# Patient Record
Sex: MALE | Race: BLACK OR AFRICAN AMERICAN | NOT HISPANIC OR LATINO | Employment: UNEMPLOYED | URBAN - NONMETROPOLITAN AREA
[De-identification: names, ages, dates, MRNs, and addresses within clinical notes are randomized per-mention and may not be internally consistent; named-entity substitution may affect disease eponyms.]

---

## 2024-05-28 ENCOUNTER — HOSPITAL ENCOUNTER (INPATIENT)
Facility: HOSPITAL | Age: 29
LOS: 2 days | Discharge: NURSING, REHAB OR LONG-TERM CARE | DRG: 897 | End: 2024-05-30
Attending: PSYCHIATRY & NEUROLOGY | Admitting: PSYCHIATRY & NEUROLOGY
Payer: COMMERCIAL

## 2024-05-28 ENCOUNTER — HOSPITAL ENCOUNTER (EMERGENCY)
Facility: HOSPITAL | Age: 29
Discharge: PSYCHIATRIC OR SUBSTANCE USE FACILITY | DRG: 897 | End: 2024-05-28
Attending: EMERGENCY MEDICINE | Admitting: EMERGENCY MEDICINE
Payer: COMMERCIAL

## 2024-05-28 VITALS
HEART RATE: 94 BPM | TEMPERATURE: 98.9 F | WEIGHT: 141 LBS | BODY MASS INDEX: 19.74 KG/M2 | DIASTOLIC BLOOD PRESSURE: 93 MMHG | OXYGEN SATURATION: 100 % | SYSTOLIC BLOOD PRESSURE: 142 MMHG | RESPIRATION RATE: 18 BRPM | HEIGHT: 71 IN

## 2024-05-28 DIAGNOSIS — F19.10 SUBSTANCE ABUSE: Primary | ICD-10-CM

## 2024-05-28 LAB
ALBUMIN SERPL-MCNC: 4.3 G/DL (ref 3.5–5.2)
ALBUMIN/GLOB SERPL: 1.4 G/DL
ALP SERPL-CCNC: 83 U/L (ref 39–117)
ALT SERPL W P-5'-P-CCNC: 20 U/L (ref 1–41)
AMPHET+METHAMPHET UR QL: NEGATIVE
AMPHETAMINES UR QL: NEGATIVE
ANION GAP SERPL CALCULATED.3IONS-SCNC: 6.5 MMOL/L (ref 5–15)
AST SERPL-CCNC: 16 U/L (ref 1–40)
BARBITURATES UR QL SCN: NEGATIVE
BASOPHILS # BLD AUTO: 0.05 10*3/MM3 (ref 0–0.2)
BASOPHILS NFR BLD AUTO: 0.6 % (ref 0–1.5)
BENZODIAZ UR QL SCN: NEGATIVE
BILIRUB SERPL-MCNC: 0.3 MG/DL (ref 0–1.2)
BILIRUB UR QL STRIP: NEGATIVE
BUN SERPL-MCNC: 11 MG/DL (ref 6–20)
BUN/CREAT SERPL: 12.2 (ref 7–25)
BUPRENORPHINE SERPL-MCNC: NEGATIVE NG/ML
CALCIUM SPEC-SCNC: 9.9 MG/DL (ref 8.6–10.5)
CANNABINOIDS SERPL QL: NEGATIVE
CHLORIDE SERPL-SCNC: 105 MMOL/L (ref 98–107)
CLARITY UR: ABNORMAL
CO2 SERPL-SCNC: 28.5 MMOL/L (ref 22–29)
COCAINE UR QL: POSITIVE
COLOR UR: YELLOW
CREAT SERPL-MCNC: 0.9 MG/DL (ref 0.76–1.27)
DEPRECATED RDW RBC AUTO: 43 FL (ref 37–54)
EGFRCR SERPLBLD CKD-EPI 2021: 118.6 ML/MIN/1.73
EOSINOPHIL # BLD AUTO: 0.18 10*3/MM3 (ref 0–0.4)
EOSINOPHIL NFR BLD AUTO: 2.2 % (ref 0.3–6.2)
ERYTHROCYTE [DISTWIDTH] IN BLOOD BY AUTOMATED COUNT: 13.6 % (ref 12.3–15.4)
ETHANOL BLD-MCNC: <10 MG/DL (ref 0–10)
ETHANOL UR QL: <0.01 %
FENTANYL UR-MCNC: POSITIVE NG/ML
GLOBULIN UR ELPH-MCNC: 3.1 GM/DL
GLUCOSE SERPL-MCNC: 103 MG/DL (ref 65–99)
GLUCOSE UR STRIP-MCNC: NEGATIVE MG/DL
HAV IGM SERPL QL IA: NORMAL
HBV CORE IGM SERPL QL IA: NORMAL
HBV SURFACE AG SERPL QL IA: NORMAL
HCT VFR BLD AUTO: 44.8 % (ref 37.5–51)
HCV AB SER QL: NORMAL
HGB BLD-MCNC: 14.2 G/DL (ref 13–17.7)
HGB UR QL STRIP.AUTO: NEGATIVE
IMM GRANULOCYTES # BLD AUTO: 0.01 10*3/MM3 (ref 0–0.05)
IMM GRANULOCYTES NFR BLD AUTO: 0.1 % (ref 0–0.5)
KETONES UR QL STRIP: NEGATIVE
LEUKOCYTE ESTERASE UR QL STRIP.AUTO: NEGATIVE
LYMPHOCYTES # BLD AUTO: 3.07 10*3/MM3 (ref 0.7–3.1)
LYMPHOCYTES NFR BLD AUTO: 37 % (ref 19.6–45.3)
MAGNESIUM SERPL-MCNC: 2.2 MG/DL (ref 1.6–2.6)
MCH RBC QN AUTO: 27.5 PG (ref 26.6–33)
MCHC RBC AUTO-ENTMCNC: 31.7 G/DL (ref 31.5–35.7)
MCV RBC AUTO: 86.7 FL (ref 79–97)
METHADONE UR QL SCN: NEGATIVE
MONOCYTES # BLD AUTO: 0.59 10*3/MM3 (ref 0.1–0.9)
MONOCYTES NFR BLD AUTO: 7.1 % (ref 5–12)
NEUTROPHILS NFR BLD AUTO: 4.39 10*3/MM3 (ref 1.7–7)
NEUTROPHILS NFR BLD AUTO: 53 % (ref 42.7–76)
NITRITE UR QL STRIP: NEGATIVE
NRBC BLD AUTO-RTO: 0 /100 WBC (ref 0–0.2)
OPIATES UR QL: NEGATIVE
OXYCODONE UR QL SCN: NEGATIVE
PCP UR QL SCN: NEGATIVE
PH UR STRIP.AUTO: 8 [PH] (ref 5–8)
PLATELET # BLD AUTO: 263 10*3/MM3 (ref 140–450)
PMV BLD AUTO: 8.8 FL (ref 6–12)
POTASSIUM SERPL-SCNC: 4.2 MMOL/L (ref 3.5–5.2)
PROT SERPL-MCNC: 7.4 G/DL (ref 6–8.5)
PROT UR QL STRIP: NEGATIVE
RBC # BLD AUTO: 5.17 10*6/MM3 (ref 4.14–5.8)
SODIUM SERPL-SCNC: 140 MMOL/L (ref 136–145)
SP GR UR STRIP: 1.02 (ref 1–1.03)
TRICYCLICS UR QL SCN: NEGATIVE
UROBILINOGEN UR QL STRIP: ABNORMAL
WBC NRBC COR # BLD AUTO: 8.29 10*3/MM3 (ref 3.4–10.8)

## 2024-05-28 PROCEDURE — 99285 EMERGENCY DEPT VISIT HI MDM: CPT

## 2024-05-28 PROCEDURE — 93010 ELECTROCARDIOGRAM REPORT: CPT | Performed by: SPECIALIST

## 2024-05-28 PROCEDURE — 80307 DRUG TEST PRSMV CHEM ANLYZR: CPT | Performed by: PHYSICIAN ASSISTANT

## 2024-05-28 PROCEDURE — 93005 ELECTROCARDIOGRAM TRACING: CPT | Performed by: PSYCHIATRY & NEUROLOGY

## 2024-05-28 PROCEDURE — 63710000001 ONDANSETRON ODT 4 MG TABLET DISPERSIBLE: Performed by: PHYSICIAN ASSISTANT

## 2024-05-28 PROCEDURE — 85025 COMPLETE CBC W/AUTO DIFF WBC: CPT | Performed by: PHYSICIAN ASSISTANT

## 2024-05-28 PROCEDURE — 83735 ASSAY OF MAGNESIUM: CPT | Performed by: PHYSICIAN ASSISTANT

## 2024-05-28 PROCEDURE — 82077 ASSAY SPEC XCP UR&BREATH IA: CPT | Performed by: PHYSICIAN ASSISTANT

## 2024-05-28 PROCEDURE — 36415 COLL VENOUS BLD VENIPUNCTURE: CPT

## 2024-05-28 PROCEDURE — 80053 COMPREHEN METABOLIC PANEL: CPT | Performed by: PHYSICIAN ASSISTANT

## 2024-05-28 PROCEDURE — 81003 URINALYSIS AUTO W/O SCOPE: CPT | Performed by: PHYSICIAN ASSISTANT

## 2024-05-28 PROCEDURE — HZ2ZZZZ DETOXIFICATION SERVICES FOR SUBSTANCE ABUSE TREATMENT: ICD-10-PCS | Performed by: PSYCHIATRY & NEUROLOGY

## 2024-05-28 PROCEDURE — 80074 ACUTE HEPATITIS PANEL: CPT | Performed by: PSYCHIATRY & NEUROLOGY

## 2024-05-28 RX ORDER — ALUMINA, MAGNESIA, AND SIMETHICONE 2400; 2400; 240 MG/30ML; MG/30ML; MG/30ML
15 SUSPENSION ORAL EVERY 6 HOURS PRN
Status: DISCONTINUED | OUTPATIENT
Start: 2024-05-28 | End: 2024-05-30 | Stop reason: HOSPADM

## 2024-05-28 RX ORDER — BENZTROPINE MESYLATE 1 MG/ML
1 INJECTION INTRAMUSCULAR; INTRAVENOUS ONCE AS NEEDED
Status: DISCONTINUED | OUTPATIENT
Start: 2024-05-28 | End: 2024-05-30 | Stop reason: HOSPADM

## 2024-05-28 RX ORDER — CLONIDINE HYDROCHLORIDE 0.1 MG/1
0.1 TABLET ORAL 4 TIMES DAILY PRN
Status: DISPENSED | OUTPATIENT
Start: 2024-05-28 | End: 2024-05-29

## 2024-05-28 RX ORDER — FAMOTIDINE 20 MG/1
20 TABLET, FILM COATED ORAL 2 TIMES DAILY PRN
Status: DISCONTINUED | OUTPATIENT
Start: 2024-05-28 | End: 2024-05-30 | Stop reason: HOSPADM

## 2024-05-28 RX ORDER — ECHINACEA PURPUREA EXTRACT 125 MG
2 TABLET ORAL AS NEEDED
Status: DISCONTINUED | OUTPATIENT
Start: 2024-05-28 | End: 2024-05-30 | Stop reason: HOSPADM

## 2024-05-28 RX ORDER — IBUPROFEN 400 MG/1
400 TABLET ORAL EVERY 6 HOURS PRN
Status: DISCONTINUED | OUTPATIENT
Start: 2024-05-28 | End: 2024-05-30 | Stop reason: HOSPADM

## 2024-05-28 RX ORDER — ONDANSETRON 4 MG/1
4 TABLET, ORALLY DISINTEGRATING ORAL EVERY 6 HOURS PRN
Status: DISCONTINUED | OUTPATIENT
Start: 2024-05-28 | End: 2024-05-30 | Stop reason: HOSPADM

## 2024-05-28 RX ORDER — LOPERAMIDE HYDROCHLORIDE 2 MG/1
2 CAPSULE ORAL
Status: DISCONTINUED | OUTPATIENT
Start: 2024-05-28 | End: 2024-05-30 | Stop reason: HOSPADM

## 2024-05-28 RX ORDER — TRAZODONE HYDROCHLORIDE 50 MG/1
50 TABLET ORAL NIGHTLY PRN
Status: DISCONTINUED | OUTPATIENT
Start: 2024-05-28 | End: 2024-05-30 | Stop reason: HOSPADM

## 2024-05-28 RX ORDER — CLONIDINE HYDROCHLORIDE 0.1 MG/1
0.1 TABLET ORAL 4 TIMES DAILY PRN
Status: DISPENSED | OUTPATIENT
Start: 2024-05-29 | End: 2024-05-30

## 2024-05-28 RX ORDER — BENZTROPINE MESYLATE 1 MG/1
2 TABLET ORAL ONCE AS NEEDED
Status: DISCONTINUED | OUTPATIENT
Start: 2024-05-28 | End: 2024-05-30 | Stop reason: HOSPADM

## 2024-05-28 RX ORDER — HYDROXYZINE 50 MG/1
50 TABLET, FILM COATED ORAL EVERY 6 HOURS PRN
Status: DISCONTINUED | OUTPATIENT
Start: 2024-05-28 | End: 2024-05-30 | Stop reason: HOSPADM

## 2024-05-28 RX ORDER — CLONIDINE HYDROCHLORIDE 0.1 MG/1
0.1 TABLET ORAL ONCE
Status: COMPLETED | OUTPATIENT
Start: 2024-05-28 | End: 2024-05-28

## 2024-05-28 RX ORDER — ONDANSETRON 2 MG/ML
4 INJECTION INTRAMUSCULAR; INTRAVENOUS ONCE
Status: DISCONTINUED | OUTPATIENT
Start: 2024-05-28 | End: 2024-05-28

## 2024-05-28 RX ORDER — ACETAMINOPHEN 325 MG/1
650 TABLET ORAL EVERY 6 HOURS PRN
Status: DISCONTINUED | OUTPATIENT
Start: 2024-05-28 | End: 2024-05-30 | Stop reason: HOSPADM

## 2024-05-28 RX ORDER — DICYCLOMINE HYDROCHLORIDE 10 MG/1
10 CAPSULE ORAL 3 TIMES DAILY PRN
Status: DISCONTINUED | OUTPATIENT
Start: 2024-05-28 | End: 2024-05-30 | Stop reason: HOSPADM

## 2024-05-28 RX ORDER — ONDANSETRON 4 MG/1
4 TABLET, ORALLY DISINTEGRATING ORAL ONCE
Status: COMPLETED | OUTPATIENT
Start: 2024-05-28 | End: 2024-05-28

## 2024-05-28 RX ORDER — BENZONATATE 100 MG/1
100 CAPSULE ORAL 3 TIMES DAILY PRN
Status: DISCONTINUED | OUTPATIENT
Start: 2024-05-28 | End: 2024-05-30 | Stop reason: HOSPADM

## 2024-05-28 RX ORDER — CLONIDINE HYDROCHLORIDE 0.1 MG/1
0.1 TABLET ORAL 3 TIMES DAILY PRN
Status: DISCONTINUED | OUTPATIENT
Start: 2024-05-30 | End: 2024-05-30 | Stop reason: HOSPADM

## 2024-05-28 RX ORDER — CLONIDINE HYDROCHLORIDE 0.1 MG/1
0.1 TABLET ORAL ONCE AS NEEDED
Status: DISCONTINUED | OUTPATIENT
Start: 2024-06-01 | End: 2024-05-30 | Stop reason: HOSPADM

## 2024-05-28 RX ORDER — CLONIDINE HYDROCHLORIDE 0.1 MG/1
0.1 TABLET ORAL 2 TIMES DAILY PRN
Status: DISCONTINUED | OUTPATIENT
Start: 2024-05-31 | End: 2024-05-30 | Stop reason: HOSPADM

## 2024-05-28 RX ORDER — HYDRALAZINE HYDROCHLORIDE 25 MG/1
25 TABLET, FILM COATED ORAL DAILY PRN
Status: DISCONTINUED | OUTPATIENT
Start: 2024-05-28 | End: 2024-05-30 | Stop reason: HOSPADM

## 2024-05-28 RX ORDER — CYCLOBENZAPRINE HCL 10 MG
10 TABLET ORAL 3 TIMES DAILY PRN
Status: DISCONTINUED | OUTPATIENT
Start: 2024-05-28 | End: 2024-05-30 | Stop reason: HOSPADM

## 2024-05-28 RX ADMIN — CLONIDINE HYDROCHLORIDE 0.1 MG: 0.1 TABLET ORAL at 15:05

## 2024-05-28 RX ADMIN — CLONIDINE HYDROCHLORIDE 0.1 MG: 0.1 TABLET ORAL at 12:43

## 2024-05-28 RX ADMIN — CYCLOBENZAPRINE 10 MG: 10 TABLET, FILM COATED ORAL at 15:05

## 2024-05-28 RX ADMIN — HYDROXYZINE HYDROCHLORIDE 50 MG: 50 TABLET, FILM COATED ORAL at 15:05

## 2024-05-28 RX ADMIN — IBUPROFEN 400 MG: 400 TABLET, FILM COATED ORAL at 21:50

## 2024-05-28 RX ADMIN — ONDANSETRON 4 MG: 4 TABLET, ORALLY DISINTEGRATING ORAL at 12:43

## 2024-05-28 RX ADMIN — CLONIDINE HYDROCHLORIDE 0.1 MG: 0.1 TABLET ORAL at 21:50

## 2024-05-28 RX ADMIN — IBUPROFEN 400 MG: 400 TABLET, FILM COATED ORAL at 15:05

## 2024-05-28 RX ADMIN — TRAZODONE HYDROCHLORIDE 50 MG: 50 TABLET ORAL at 21:50

## 2024-05-28 NOTE — ED PROVIDER NOTES
Subjective   History of Present Illness  28-year-old male presents secondary to need for detox from fentanyl and cocaine.  He states that his last use was 3 days ago.  He states he has had nausea vomiting abdominal pain and cramping.  No suicidal homicidal ideation      Review of Systems   Constitutional: Negative.  Negative for fever.   HENT: Negative.     Respiratory: Negative.     Cardiovascular: Negative.  Negative for chest pain.   Gastrointestinal: Negative.  Negative for abdominal pain.   Endocrine: Negative.    Genitourinary: Negative.  Negative for dysuria.   Skin: Negative.    Neurological: Negative.    Psychiatric/Behavioral:  Negative for suicidal ideas.    All other systems reviewed and are negative.      Past Medical History:   Diagnosis Date    Substance abuse        No Known Allergies    Past Surgical History:   Procedure Laterality Date    NO PAST SURGERIES         History reviewed. No pertinent family history.    Social History     Socioeconomic History    Marital status: Single     Spouse name: denies    Number of children: 1    Highest education level: High school graduate   Tobacco Use    Smoking status: Every Day     Current packs/day: 0.25     Average packs/day: 0.3 packs/day for 5.0 years (1.3 ttl pk-yrs)     Types: Cigarettes     Start date: 5/28/2019     Passive exposure: Current    Smokeless tobacco: Never   Vaping Use    Vaping status: Never Used   Substance and Sexual Activity    Alcohol use: Not Currently     Comment: denies    Drug use: Yes     Types: Fentanyl, Cocaine(coke)    Sexual activity: Defer           Objective   Physical Exam  Vitals and nursing note reviewed.   Constitutional:       General: He is not in acute distress.     Appearance: He is well-developed. He is not diaphoretic.   HENT:      Head: Normocephalic and atraumatic.      Right Ear: External ear normal.      Left Ear: External ear normal.      Nose: Nose normal.   Eyes:      Conjunctiva/sclera: Conjunctivae  normal.      Pupils: Pupils are equal, round, and reactive to light.   Neck:      Vascular: No JVD.      Trachea: No tracheal deviation.   Cardiovascular:      Rate and Rhythm: Normal rate and regular rhythm.      Heart sounds: Normal heart sounds. No murmur heard.  Pulmonary:      Effort: Pulmonary effort is normal. No respiratory distress.      Breath sounds: Normal breath sounds. No wheezing.   Abdominal:      General: Bowel sounds are normal.      Palpations: Abdomen is soft.      Tenderness: There is no abdominal tenderness.   Musculoskeletal:         General: No deformity. Normal range of motion.      Cervical back: Normal range of motion and neck supple.   Skin:     General: Skin is warm and dry.      Coloration: Skin is not pale.      Findings: No erythema or rash.   Neurological:      Mental Status: He is alert and oriented to person, place, and time.      Cranial Nerves: No cranial nerve deficit.   Psychiatric:         Behavior: Behavior normal.         Thought Content: Thought content normal. Thought content does not include homicidal or suicidal ideation.         Procedures           ED Course                                             Medical Decision Making  28-year-old male presents secondary to need for detox from fentanyl and cocaine.  He states that his last use was 3 days ago.  He states he has had nausea vomiting abdominal pain and cramping.  No suicidal homicidal ideation    Amount and/or Complexity of Data Reviewed  Labs: ordered. Decision-making details documented in ED Course.    Risk  Prescription drug management.        Final diagnoses:   Substance abuse       ED Disposition  ED Disposition       ED Disposition   DC/Transfer to Behavioral Health    Condition   Stable    Comment   --               No follow-up provider specified.       Medication List      No changes were made to your prescriptions during this visit.            Edgar Azevedo PA  05/28/24 1942

## 2024-05-28 NOTE — NURSING NOTE
Pt assessment complete    [facility] he [facility]/24   Pt states he has been using Fentanyl- 1 gram daily intranasally last use 2-3 days ago   Pt reports using cocaine- 1 gram daily intranasally last use 2-3 days ago.   Cows 13  Denies si/hi/avh   Poor appetite/sleep   Depression 7  Anxiety 10

## 2024-05-29 PROBLEM — F14.20 COCAINE USE DISORDER, SEVERE, DEPENDENCE: Status: ACTIVE | Noted: 2024-05-29

## 2024-05-29 PROBLEM — F11.20 OPIOID USE DISORDER, SEVERE, DEPENDENCE: Status: ACTIVE | Noted: 2024-05-28

## 2024-05-29 LAB
QT INTERVAL: 360 MS
QTC INTERVAL: 391 MS

## 2024-05-29 PROCEDURE — 99222 1ST HOSP IP/OBS MODERATE 55: CPT | Performed by: PSYCHIATRY & NEUROLOGY

## 2024-05-29 RX ADMIN — CYCLOBENZAPRINE 10 MG: 10 TABLET, FILM COATED ORAL at 09:07

## 2024-05-29 RX ADMIN — TRAZODONE HYDROCHLORIDE 50 MG: 50 TABLET ORAL at 21:28

## 2024-05-29 RX ADMIN — CYCLOBENZAPRINE 10 MG: 10 TABLET, FILM COATED ORAL at 21:28

## 2024-05-29 RX ADMIN — CLONIDINE HYDROCHLORIDE 0.1 MG: 0.1 TABLET ORAL at 09:07

## 2024-05-29 NOTE — PROGRESS NOTES
Navigator is helping with the following referral:    [facility] - [phone]  -Spoke with staff. They will accept patient at discharge. 5/29

## 2024-05-29 NOTE — PLAN OF CARE
Goal Outcome Evaluation:  Plan of Care Reviewed With: patient  Patient Agreement with Plan of Care: agrees     Progress: no change  Outcome Evaluation: New admission previous shift; pt oriented to unit rules and medications. Pt denies SI/HI/AVH.  Pt appeared to sleep throughout the night, approximately 6 hours this shift.

## 2024-05-29 NOTE — H&P
INITIAL PSYCHIATRIC HISTORY & PHYSICAL    Patient Identification:  Name:   Harpal Poe  Age:   28 y.o.  Sex:   male  :   1995  MRN:   [id number]  Visit Number:   [id number]  Primary Care Physician:   Provider, No Known    SUBJECTIVE    CC/Focus of Exam detox    HPI: Harpal Poe is a 28 y.o. male who was admitted on 2024 with complaints of drug use and withdrawals. The patient reports a long history of substance use. First use was 19 years old. Over time the use increased and the patient  continued to use despite negative consequences including relationship problems, social and financial problems. The patient endorses symptoms of tolerance and withdrawals and ongoing cravings to use. Has tried to cut down and stop but has not been successful. Spends too much time and resources in pursuit of substance use. Longest period of sobriety is reported to be 7 months.  Currently using fentanyl, cocaine each a gram daily via snorting.   Last use 2024  Withdrawal symptoms body aches, headaches, restlessness, chills, sweats  Patient denies any alcohol abuse.  Patient states that he uses tobacco.  Patient denies any history of seizures with withdrawal.  Patient states he got bored and relapsed.  Patient states finances as a stressor in his life.  Patient denies any history of physical, mental, or sexual abuse.  Patient rates his appetite as poor.  Patient rates his sleep as poor.  Patient states that he has nightmares.  Patient rates his anxiety on a scale of 1-10 with 10 being the most severe a 10.  Patient rates his depression on a scale of 1-10 with 10 being the most severe a 7.  Patient rates his cravings on a scale of 1-10 with 10 being the most severe a 10.  Patient's COWS was 13.  Patient denies any suicidal ideation.  Patient denies any homicidal ideation.  Patient denies any hallucinations.  Patient was admitted to [facility] psychiatry for further safety and  stabilization.    Available medical/psychiatric records reviewed and incorporated into the current document.     PAST PSYCHIATRIC HX: Patient has had no prior admissions.  Patient denies any outpatient care.    SUBSTANCE USE HX: UDS was positive for fentanyl, cocaine.  See HPI for current use.    SOCIAL HX: Patient states he was born and raised in [facility].  Patient states he currently resides with his mother in [place].  Patient states he is single and has 1 daughter that lives with her paternal grandmother.  Patient states he is currently unemployed.  Patient states he has a high school diploma.  Patient denies any legal issues.    Past Medical History:   Diagnosis Date    Substance abuse        Past Surgical History:   Procedure Laterality Date    NO PAST SURGERIES         History reviewed. No pertinent family history.      No medications prior to admission.           ALLERGIES:  Patient has no known allergies.    Temp:  [97.1 °F (36.2 °C)-97.6 °F (36.4 °C)] 97.3 °F (36.3 °C)  Heart Rate:  [] 96  Resp:  [16-18] 18  BP: (101-136)/(63-90) 116/74    REVIEW OF SYSTEMS:  Review of Systems   Constitutional:  Positive for chills, diaphoresis and fatigue.   HENT: Negative.  Positive for rhinorrhea.    Eyes: Negative.    Respiratory: Negative.     Cardiovascular: Negative.    Gastrointestinal:  Positive for nausea.   Endocrine: Negative.    Genitourinary: Negative.    Musculoskeletal: Negative.  Positive for arthralgias and myalgias.   Skin: Negative.    Allergic/Immunologic: Negative.    Neurological:  Positive for weakness.   Hematological: Negative.    Psychiatric/Behavioral:  Positive for dysphoric mood. The patient is nervous/anxious.       See HPI for psychiatric ROS  OBJECTIVE    PHYSICAL EXAM:  Physical Exam  Constitutional:  Appears well-developed and well-nourished.   HENT:   Head: Normocephalic and atraumatic.   Right Ear: External ear normal.   Left Ear: External ear normal.   Mouth/Throat:  Oropharynx is clear and moist.   Eyes: Pupils are equal, round, and reactive to light. Conjunctivae and EOM are normal.   Neck: Normal range of motion. Neck supple.   Cardiovascular: Normal rate, regular rhythm and normal heart sounds.    Respiratory: Effort normal and breath sounds normal. No respiratory distress. No wheezes.   GI: Soft. Bowel sounds are normal.No distension. There is no tenderness.   Musculoskeletal: Normal range of motion. No edema or deformity.   Neurological:No cranial nerve deficit. Coordination normal.   Skin: Skin is warm and dry. No rash noted. No erythema.     MENTAL STATUS EXAM:               Hygiene:   fair  Cooperation:  Cooperative  Eye Contact:  Good  Psychomotor Behavior:  Appropriate  Affect:  Appropriate  Hopelessness: 5  Speech:  Normal  Linear  Thought Content:  Normal  Suicidal:  None  Homicidal:  None  Hallucinations:  None  Delusion:  None  Memory:  Intact  Orientation:  Person, Place, Time, and Situation  Reliability:  fair  Insight:  Fair  Judgement:  Poor  Impulse Control:  Poor      Imaging Results (Last 24 Hours)       ** No results found for the last 24 hours. **             ECG/EMG Results (most recent)       Procedure Component Value Units Date/Time    ECG 12 Lead Other; Baseline Cardiac Status [[id number]] Collected: 05/28/24 1642     Updated: 05/29/24 0947     QT Interval 360 ms      QTC Interval 391 ms     Narrative:      Test Reason : Other~  Blood Pressure :   */*   mmHG  Vent. Rate :  71 BPM     Atrial Rate :  71 BPM     P-R Int : 168 ms          QRS Dur :  88 ms      QT Int : 360 ms       P-R-T Axes :  77  83  61 degrees     QTc Int : 391 ms    Normal sinus rhythm  Nonspecific ST abnormality  Abnormal ECG  When compared with ECG of 28-MAY-2024 16:42, (Unconfirmed)  No significant change was found  Confirmed by Angie Rushing ([id number]) on 5/29/2024 9:47:17 AM    Referred By: FRANSICO           Confirmed By: Angie Rushing             Lab Results   Component Value Date     GLUCOSE 103 (H) 05/28/2024    BUN 11 05/28/2024    CREATININE 0.90 05/28/2024    BCR 12.2 05/28/2024    CO2 28.5 05/28/2024    CALCIUM 9.9 05/28/2024    ALBUMIN 4.3 05/28/2024    AST 16 05/28/2024    ALT 20 05/28/2024       Lab Results   Component Value Date    WBC 8.29 05/28/2024    HGB 14.2 05/28/2024    HCT 44.8 05/28/2024    MCV 86.7 05/28/2024     05/28/2024       Pain Management Panel          Latest Ref Rng & Units 5/28/2024   Pain Management Panel   Amphetamine, Urine Qual Negative Negative    Barbiturates Screen, Urine Negative Negative    Benzodiazepine Screen, Urine Negative Negative    Buprenorphine, Screen, Urine Negative Negative    Cocaine Screen, Urine Negative Positive    Fentanyl, Urine Negative Positive    Methadone Screen , Urine Negative Negative    Methamphetamine, Ur Negative Negative        Brief Urine Lab Results  (Last result in the past 365 days)        Color   Clarity   Blood   Leuk Est   Nitrite   Protein   CREAT   Urine HCG        05/28/24 1121 Yellow   Cloudy   Negative   Negative   Negative   Negative                   Reviewed labs and studies done with this admission.       ASSESSMENT & PLAN:        Opioid use disorder, severe, dependence  -Clonidine detox  -Comfort meds      Cocaine use disorder, severe, dependence  -Supportive treatment      Nicotine use disorder  -Encouraged cessation    Hospital bed: No    The patient has been admitted for safety and stabilization.  Patient will be monitored for suicidality daily and maintained on Special Precautions Level 4 (q30 min checks).  The patient will have individual and group therapy with a master's level therapist. A master treatment plan will be developed and agreed upon by the patient and his/her treatment team.  The patient's estimated length of stay in the hospital is 5-7 days.       Written by Elba Amezcua acting as scribe for Dr.Mazhar Resendiz signature on this note affirms that the note adequately documents the care  provided.   This note was generated using a scribe,   Elba Amezcua MA  05/29/24  12:49 PM EDT    I, Keily Resendiz MD, personally performed the services described in this documentation as scribed by the above named individual in my presence, and it is both accurate and complete.

## 2024-05-29 NOTE — PLAN OF CARE
Goal Outcome Evaluation:        Problem: Adult Behavioral Health Plan of Care  Goal: Patient-Specific Goal (Individualization)  Outcome: Ongoing, Progressing  Flowsheets  Taken 5/29/2024 0929 by Estephania Webb CSW  Patient-Specific Goals (Include Timeframe): Identify 2-3 coping skills, address relapse prevention methods, complete aftercare plans, and deny SI/HI prior to discharge.  Individualized Care Needs: Therapist to offer 1-4 therapy sessions, aftercare planning, safety planning, family education, group therapy, and brief CBT/MI interventions.  Taken 5/29/2024 0923 by Estephania Webb CSW  Patient Personal Strengths:   resilient   resourceful   motivated for recovery   motivated for treatment   family/social support   stable living environment  Patient Vulnerabilities:   occupational insecurity   history of unsuccessful treatment   poor impulse control   lacks insight into illness   substance abuse/addiction  Taken 5/28/2024 1504 by Tia Hall RN  Anxieties, Fears or Concerns: None verbalized  Goal: Optimized Coping Skills in Response to Life Stressors  Outcome: Ongoing, Progressing  Flowsheets (Taken 5/29/2024 0929)  Optimized Coping Skills in Response to Life Stressors: making progress toward outcome  Intervention: Promote Effective Coping Strategies  Flowsheets (Taken 5/29/2024 0929)  Supportive Measures:   active listening utilized   counseling provided   decision-making supported   goal-setting facilitated   verbalization of feelings encouraged   self-responsibility promoted   self-reflection promoted   positive reinforcement provided   self-care encouraged  Goal: Develops/Participates in Therapeutic Lester to Support Successful Transition  Outcome: Ongoing, Progressing  Flowsheets (Taken 5/29/2024 0929)  Develops/Participates in Therapeutic Lester to Support Successful Transition: making progress toward outcome  Intervention: Foster Therapeutic Lester  Flowsheets (Taken 5/29/2024  0982)  Trust Relationship/Rapport:   care explained   questions encouraged   choices provided   reassurance provided   emotional support provided   thoughts/feelings acknowledged   empathic listening provided   questions answered  Intervention: Mutually Develop Transition Plan  Flowsheets  Taken 5/29/2024 0929  Outpatient/Agency/Support Group Needs: residential services  Transition Support:   follow-up care discussed   follow-up care coordinated   community resources reviewed   crisis management plan promoted   crisis management plan verbalized  Anticipated Discharge Disposition: residential substance use unit  Taken 5/29/2024 0927  Discharge Coordination/Progress: Patient has [facility] [facility]. Therapist met with patient to complete assessment. Patient presents from [facility].  Transportation Anticipated: agency  Transportation Concerns: none  Current Discharge Risk: substance use/abuse  Concerns to be Addressed:   substance/tobacco abuse/use   coping/stress   cognitive/perceptual   mental health   discharge planning  Readmission Within the Last 30 Days: no previous admission in last 30 days  Patient/Family Anticipated Services at Transition: rehabilitation services  Patient's Choice of Community Agency(s): [facility]  Patient/Family Anticipates Transition to: inpatient rehabilitation facility  Offered/Gave Vendor List: no      DATA:      Therapist met individually with patient this date to introduce role and to discuss hospitalization expectations. Patient agreeable.     Patient has signed consent for [facility].     Clinical Maneuvering/Intervention:     Therapist assisted patient in processing session content; acknowledged and normalized patient’s thoughts, feelings, and concerns. Discussed the therapist/patient relationship and explain the parameters and limitations of relative confidentiality. Also discussed the importance of active participation, and honesty to the  treatment process. Encouraged the patient to discuss/vent their feelings, frustrations, and fears concerning their ongoing medical issues and validated their feelings.     Discussed the importance of finding enjoyable activities and coping skills that the patient can engage in a regular basis. Discussed healthy coping skills such as distraction, self love, grounding, thought challenges/reframing, etc. Provided patient with list of healthy coping skills this date. Discussed the importance of medication compliance. Praised the patient for seeking help and spent the majority of the session building rapport.       Allowed patient to freely discuss issues without interruption or judgment. Provided safe, confidential environment to facilitate the development of positive therapeutic relationship and encourage open, honest communication.      Therapist addressed discharge safety planning this date. Assisted patient in identifying risk factors which would indicate the need for higher level of care after discharge;  including thoughts to harm self or others and/or self-harming behavior. Encouraged patient to call 911, or present to the nearest emergency room should any of these events occur. Discussed crisis intervention services and means to access. Encouraged securing any objects of harm.       Therapist completed integrated summary, treatment plan, and initiated social history this date. Therapist is strongly encouraging family involvement in treatment.       ASSESSMENT:      The patient is a 27 y/o male admitted for detox treatment. Therapist met with patient on this date to complete assessment. Patient reports high anxiety and depression, denies SI/HI/AVH. He reports use of fentanyl and cocaine. Reports experiencing sweats, restlessness, chills, and body aches. Patient has had no past [facility] admissions. Longest period of sobriety has been 7 months. Patient normally lives at home with his mother. Presents from  [facility] at this time and plans to return. Agency will provide transportation at discharge.    PLAN:       Patient to remain hospitalized this date.     Treatment team will focus efforts on stabilizing patient's acute symptoms while providing education on healthy coping and crisis management to reduce hospitalizations. Patient requires daily psychiatrist evaluation and 24/7 nursing supervision to promote patient safety.     Therapist will offer 1-4 individual sessions, 1 therapy group daily, family education, and appropriate referral.    Therapist recommends [facility] residential rehab.

## 2024-05-29 NOTE — PLAN OF CARE
Goal Outcome Evaluation:  Plan of Care Reviewed With: patient  Patient Agreement with Plan of Care: agrees     Progress: improving  Outcome Evaluation: Pt has been calm and cooperative. Pt rates anxiety 10, depression 0, states sleep is poor and appetite is good. Pt denies SI/HI/AVH. Pt rates cravings 8, c/o body aches.

## 2024-05-30 VITALS
BODY MASS INDEX: 19.04 KG/M2 | OXYGEN SATURATION: 98 % | TEMPERATURE: 97.9 F | RESPIRATION RATE: 18 BRPM | HEART RATE: 96 BPM | DIASTOLIC BLOOD PRESSURE: 70 MMHG | SYSTOLIC BLOOD PRESSURE: 117 MMHG | HEIGHT: 71 IN | WEIGHT: 136 LBS

## 2024-05-30 PROCEDURE — 99238 HOSP IP/OBS DSCHRG MGMT 30/<: CPT | Performed by: PSYCHIATRY & NEUROLOGY

## 2024-05-30 RX ADMIN — HYDROXYZINE HYDROCHLORIDE 50 MG: 50 TABLET, FILM COATED ORAL at 08:32

## 2024-05-30 RX ADMIN — CYCLOBENZAPRINE 10 MG: 10 TABLET, FILM COATED ORAL at 08:32

## 2024-05-30 RX ADMIN — IBUPROFEN 400 MG: 400 TABLET, FILM COATED ORAL at 08:32

## 2024-05-30 RX ADMIN — DICYCLOMINE HYDROCHLORIDE 10 MG: 10 CAPSULE ORAL at 08:32

## 2024-05-30 NOTE — CASE MANAGEMENT/SOCIAL WORK
Patient Name:  Harpal Poe  YOB: 1995  MRN: [id number]  Admit Date:  5/28/2024    Patient is being discharged back to [facility] on this date, agency to provide transportation. Patient denies issues with mood or withdrawal symptoms, denies SI/HI/AVH. Healthy coping skills and relapse prevention have been reviewed. Patient has been assisted with identifying risk factors which would indicate the need for higher level of care including thoughts to harm self or others and/or self-harming behavior. Encouraged patient to notify rehab staff, call [phone] or present to the nearest emergency room should any of these events occur. No other needs identified.     Electronically signed by:  ARAM Mahan  05/30/24 11:10 EDT

## 2024-05-30 NOTE — NURSING NOTE
Reviewed discharge paperwork with pt, verbalized understanding. Advised pt that if he begins to experience thoughts of SI/HI/AVH to call 911, [phone] or report to the nearest ED, verbalized understanding.

## 2024-05-30 NOTE — PROGRESS NOTES
[facility] contacted [facility]. [facility] will send transportation this afternoon for patient.     [facility] - [phone]

## 2024-05-30 NOTE — DISCHARGE SUMMARY
":  1995  MRN:  [id number]  Visit Number:  [id number]      Date of Admission:2024   Date of Discharge:  2024    Discharge Diagnosis:  Principal Problem:    Opioid use disorder, severe, dependence  Active Problems:    Cocaine use disorder, severe, dependence        Admission Diagnosis:  Substance abuse [F19.10]     BECK Poe is a 28 y.o. male who was admitted on 2024 with complaints of drug use and withdrawals.   For details please see H&P dated 24.     Hospital Course  Patient is a 28 y.o. male presented with opioid and cocaine use and withdrawals. The patient was admitted to the detox recovery unit and started on clonidine detox. The patient reported feeling better and didn't experience or exhibit any active withdrawals and by 24 he felt ready to back to residential rehab.       Mental Status Exam upon discharge:   Mood \"good\"   Affect-congruent, appropriate, stable  Thought Content-goal directed, no delusional material present  Thought process-linear, organized.  Suicidality: No SI  Homicidality: No HI  Perception: No AH/VH    Procedures Performed         Consults:   Consults       No orders found from 2024 to 2024.            Pertinent Test Results:   Admission on 2024   Component Date Value Ref Range Status    Hepatitis B Surface Ag 2024 Non-Reactive  Non-Reactive Final    Hep A IgM 2024 Non-Reactive  Non-Reactive Final    Hep B C IgM 2024 Non-Reactive  Non-Reactive Final    Hepatitis C Ab 2024 Non-Reactive  Non-Reactive Final    QT Interval 2024 360  ms Final    QTC Interval 2024 391  ms Final   Admission on 2024, Discharged on 2024   Component Date Value Ref Range Status    Glucose 2024 103 (H)  65 - 99 mg/dL Final    BUN 2024 11  6 - 20 mg/dL Final    Creatinine 2024 0.90  0.76 - 1.27 mg/dL Final    Sodium 2024 140  136 - 145 mmol/L Final    Potassium 2024 4.2  3.5 - 5.2 " mmol/L Final    Slight hemolysis detected by analyzer. Result may be falsely elevated.    Chloride 05/28/2024 105  98 - 107 mmol/L Final    CO2 05/28/2024 28.5  22.0 - 29.0 mmol/L Final    Calcium 05/28/2024 9.9  8.6 - 10.5 mg/dL Final    Total Protein 05/28/2024 7.4  6.0 - 8.5 g/dL Final    Albumin 05/28/2024 4.3  3.5 - 5.2 g/dL Final    ALT (SGPT) 05/28/2024 20  1 - 41 U/L Final    AST (SGOT) 05/28/2024 16  1 - 40 U/L Final    Alkaline Phosphatase 05/28/2024 83  39 - 117 U/L Final    Total Bilirubin 05/28/2024 0.3  0.0 - 1.2 mg/dL Final    Globulin 05/28/2024 3.1  gm/dL Final    A/G Ratio 05/28/2024 1.4  g/dL Final    BUN/Creatinine Ratio 05/28/2024 12.2  7.0 - 25.0 Final    Anion Gap 05/28/2024 6.5  5.0 - 15.0 mmol/L Final    eGFR 05/28/2024 118.6  >60.0 mL/min/1.73 Final    Color, UA 05/28/2024 Yellow  Yellow, Straw Final    Appearance, UA 05/28/2024 Cloudy (A)  Clear Final    pH, UA 05/28/2024 8.0  5.0 - 8.0 Final    Specific Gravity, UA 05/28/2024 1.023  1.005 - 1.030 Final    Glucose, UA 05/28/2024 Negative  Negative Final    Ketones, UA 05/28/2024 Negative  Negative Final    Bilirubin, UA 05/28/2024 Negative  Negative Final    Blood, UA 05/28/2024 Negative  Negative Final    Protein, UA 05/28/2024 Negative  Negative Final    Leuk Esterase, UA 05/28/2024 Negative  Negative Final    Nitrite, UA 05/28/2024 Negative  Negative Final    Urobilinogen, UA 05/28/2024 1.0 E.U./dL  0.2 - 1.0 E.U./dL Final    Ethanol 05/28/2024 <10  0 - 10 mg/dL Final    Ethanol % 05/28/2024 <0.010  % Final    THC, Screen, Urine 05/28/2024 Negative  Negative Final    Phencyclidine (PCP), Urine 05/28/2024 Negative  Negative Final    Cocaine Screen, Urine 05/28/2024 Positive (A)  Negative Final    Methamphetamine, Ur 05/28/2024 Negative  Negative Final    Opiate Screen 05/28/2024 Negative  Negative Final    Amphetamine Screen, Urine 05/28/2024 Negative  Negative Final    Benzodiazepine Screen, Urine 05/28/2024 Negative  Negative Final     Tricyclic Antidepressants Screen 05/28/2024 Negative  Negative Final    Methadone Screen, Urine 05/28/2024 Negative  Negative Final    Barbiturates Screen, Urine 05/28/2024 Negative  Negative Final    Oxycodone Screen, Urine 05/28/2024 Negative  Negative Final    Buprenorphine, Screen, Urine 05/28/2024 Negative  Negative Final    Magnesium 05/28/2024 2.2  1.6 - 2.6 mg/dL Final    WBC 05/28/2024 8.29  3.40 - 10.80 10*3/mm3 Final    RBC 05/28/2024 5.17  4.14 - 5.80 10*6/mm3 Final    Hemoglobin 05/28/2024 14.2  13.0 - 17.7 g/dL Final    Hematocrit 05/28/2024 44.8  37.5 - 51.0 % Final    MCV 05/28/2024 86.7  79.0 - 97.0 fL Final    MCH 05/28/2024 27.5  26.6 - 33.0 pg Final    MCHC 05/28/2024 31.7  31.5 - 35.7 g/dL Final    RDW 05/28/2024 13.6  12.3 - 15.4 % Final    RDW-SD 05/28/2024 43.0  37.0 - 54.0 fl Final    MPV 05/28/2024 8.8  6.0 - 12.0 fL Final    Platelets 05/28/2024 263  140 - 450 10*3/mm3 Final    Neutrophil % 05/28/2024 53.0  42.7 - 76.0 % Final    Lymphocyte % 05/28/2024 37.0  19.6 - 45.3 % Final    Monocyte % 05/28/2024 7.1  5.0 - 12.0 % Final    Eosinophil % 05/28/2024 2.2  0.3 - 6.2 % Final    Basophil % 05/28/2024 0.6  0.0 - 1.5 % Final    Immature Grans % 05/28/2024 0.1  0.0 - 0.5 % Final    Neutrophils, Absolute 05/28/2024 4.39  1.70 - 7.00 10*3/mm3 Final    Lymphocytes, Absolute 05/28/2024 3.07  0.70 - 3.10 10*3/mm3 Final    Monocytes, Absolute 05/28/2024 0.59  0.10 - 0.90 10*3/mm3 Final    Eosinophils, Absolute 05/28/2024 0.18  0.00 - 0.40 10*3/mm3 Final    Basophils, Absolute 05/28/2024 0.05  0.00 - 0.20 10*3/mm3 Final    Immature Grans, Absolute 05/28/2024 0.01  0.00 - 0.05 10*3/mm3 Final    nRBC 05/28/2024 0.0  0.0 - 0.2 /100 WBC Final    Fentanyl, Urine 05/28/2024 Positive (A)  Negative Final        Condition on Discharge:  improved    Vital Signs  Temp:  [97.1 °F (36.2 °C)-98.2 °F (36.8 °C)] 97.8 °F (36.6 °C)  Heart Rate:  [80-96] 80  Resp:  [16-18] 16  BP: (116-138)/(73-84)  119/73      Discharge Disposition:  Rehab Facility or Unit (DC - External)    Discharge Medications:     Discharge Medications      Patient Not Prescribed Medications Upon Discharge         Discharge Diet: Regular     Activity at Discharge: As tolerated     Follow-up Appointments  The Next Chapter      Time: I spent  < 30  minutes on this discharge activity which included: face-to-face encounter with the patient, reviewing the data in the system, coordination of the care with the nursing staff as well as consultants, documentation, and entering orders.        Clinician:   Keily Resendiz MD  05/30/24  11:07 EDT

## 2024-05-30 NOTE — PAYOR COMM NOTE
"Harpal Poe (28 y.o. Male)       Date of Birth   1995    Social Security Number       Address   [address] [place] [id number]    Home Phone   [phone]    MRN   [id number]       Religious   [employer] [facility]    Marital Status   Single                            Admission Date   5/28/24    Admission Type   Emergency    Admitting Provider   Quincy Charles MD    Attending Provider   Quincy Charles MD    Department, Room/Bed   [facility] ADULT CD, 1039/1S       Discharge Date       Discharge Disposition       Discharge Destination                                 Attending Provider: Quincy Charles MD    Allergies: No Known Allergies    Isolation: None   Infection: None   Code Status: CPR    Ht: 180.3 cm (71\")   Wt: 61.7 kg (136 lb)    Admission Cmt: None   Principal Problem: Opioid use disorder, severe, dependence [F11.20]                   Active Insurance as of 5/28/2024       Primary Coverage       Payor Plan Insurance Group Employer/Plan Group    [employer] [employer]       Payor Plan Address Payor Plan Phone Number Payor Plan Fax Number Effective Dates    [address] [phone]  1/1/2024 - None Entered    [facility] [address]         Subscriber Name Subscriber Birth Date Member ID       HARPAL POE 1995 [id number]               Secondary Coverage       Payor Plan Insurance Group Employer/Plan Group    [facility] WELLCARE MEDICAID        Payor Plan Address Payor Plan Phone Number Payor Plan Fax Number Effective Dates    [address] [phone]  5/28/2024 - None Entered    [facility] [address]         Subscriber Name Subscriber Birth Date Member ID       HARPAL POE 1995 [id number]                     Emergency Contacts        (Rel.) Home Phone Work Phone Mobile Phone    ZOILA SHERMAN (Mother) [phone] -- --          PLEASE  ATTACH THIS INITIAL CLINICAL/AUTHORIZATION REQUEST TO PENDING " AUTHORIZATION # [id number]    PRECERT REQUEST HAS BEEN INITIATED HOWEVER HAVE NOT RECEIVED A CALL BACK TO COMPLETE CLINICAL AS INFORMED BY TYRONE EVANS FAX:  [phone]    RE:  CLINT MARTINEZ  :  1995  ID:  [id number]    ADMISSION DATE:  2024    FACILITY:  [facility]  NPI:  [id number]  TAX ID:  [id number]  ADDRESS:  [address] [place] [address]  ATTENDING:  SATNAM OSBORNE   NPI:  [id number]  ADDRESS SAME AS FACILITY     DIAGNOSIS PER H&P:    Opioid use disorder, severe, dependence (F11.20)  Cocaine use disorder, severe, dependence (F14.20)    ESTIMATED LENGTH OF STAY IS 3-4 DAYS    UR CONTACT:  KEITH KNUTSON RN  PHONE:  [phone]  FAX:  [phone]    INTAKE:     Yenny Hardin RN   Registered Nurse     Nursing Note      Signed     Date of Service: 24 1240  Creation Time: 24 124   Related encounter: ED from 2024 in [facility] EMERGENCY DEPARTMENT with Garth Franklin MD     Signed         Pt assessment complete     [facility]   Pt states he has been using Fentanyl- 1 gram daily intranasally last use 2-3 days ago   Pt reports using cocaine- 1 gram daily intranasally last use 2-3 days ago.   Cows 13  Denies si/hi/avh   Poor appetite/sleep   Depression 7  Anxiety 10                        Intake Information - 24 1515    What problem (s) brought you here today? Presented to ED from [facility] (where he has been x 1 day) requesting detox.  Reports that he has had a substance problem since he was 19 years old.  Most recently has been using 1 gram of fentanyl and 1 gram of cocaine intranasally daily.  Three prior detox admissions (none here).  Longest period of sobriety was 7 months.  Plans to return to [facility] upon discharge.     Contact Information - 24 1223    Consent to contact given for the following No Consent Given     Medical/Surgical/Psychosocial History - 24  "1221    Approximate date of last complete physical examination --  \"no pcp.\"   Do you believe that you need HIV testing? No   Do you believe that you need Hepatitis C testing? No   Have you done anything to injure or harm yourself today? No   Previous Mental Health Treatment medication   Previous Substance Use Treatment detox unit;inpatient rehab   Number of previous psychiatric admissions 0   Number of previous detox admissions 3     Family History - 05/28/24 1222    Marital status Single   Children? Yes   Who will be caring for minor children? grandmother   Place of birth [facility]   Place raised [facility]   Parent Marital Status    Location of parents mother, father [facility]   Number of siblings 2     Housing/Living Environment - 05/28/24 1223    Current Living Arrangements home   People in Home child(mary), dependent;parent(s);sibling(s)   How long have you lived with the others in your household? 29 years   Will your living arrangements affect your treatment/recovery? No   Do you need assistance with housing options? No     Education/Work/Income - 05/28/24 1224    Level of education High School Education or above   Type of education 12th   Barriers to learning Other (comment)  denies   Employment Status unemployed   Current or Previous Occupation other (see comments)   Length of time with employer za- 13 years   Source of Income none     Support System - 05/28/24 1223    Community resources/support systems current used Other (comment)   Who would patient like involved in treatment? denies   Will family be involved? No      - 05/28/24 1224    Has patient been in the ? No     Spiritual - 05/28/24 1224    [employer]   Are there spiritual concerns you feel need addressed during treatment? No   Spiritual care consult requested No     Sexual - 05/28/24 1224    Patient is in a monogamous relationship? No   Effect of drugs, alcohol or emotional problems on sexual behavior " "denies   Has patient ever been accused of inappropriate sexual behavior? No     Legal Issues - 05/28/24 1226    Has patient ever been arrested, charged or convicted of a crime? No   Does patient currently have any outstanding charges? No     Recreational - 05/28/24 1226    Recreational Hobbies   Sports Organized teams   Social/Family \"Hangs out\";Visiting;Family time   Solitary None   Additional activities cooking   Patient tends to spend time With friends;With family   Patient's ability to be close to others has been affected by Drug use     Current Stressors - 05/28/24 1227    Current stressors Other (comment)   Current stressor details \"being away from [facility].\"   Will stressors affect your treatment success or possibly cause relapse if chemically dependent? No     Screenings - 05/28/24 1228    Abuse Screen (yes response referral indicated)   Feels Unsafe at Home or Work/School no   Feels Threatened by Someone no   Does Anyone Try to Keep You From Having Contact with Others or Doing Things Outside Your Home? no   Physical Signs of Abuse Present no   AUDIT-C (Alcohol Use Disorders ID Test)   Q1: How often do you have a drink containing alcohol? Never   Q2: How many drinks containing alcohol do you have on a typical day when you are drinking? None   Q3: How often do you have six or more drinks on one occasion? Never   Audit-C Score 0   COWS (Clinical Opiate Withdrawal Scale)   Resting Pulse Rate 1-->pulse rate    Sweating 1-->subjective report of chills or flushing   Restlessness 3-->frequent shifting or extraneous movement of legs/arms   Pupil Size 0-->pupils pinned or normal size for room light   Bone or Joint Aches 2-->patient reports severe diffuse aching of joints/muscles   Runny Nose or Tearing 1-->nasal stuffiness or unusually moist eyes   GI Upset 2-->nausea or loose stool   Tremor 2-->slight tremor observable   Yawning 0-->no yawning   Anxiety or Irritability 1-->patient reports increasing " irritability or anxiousness   Gooseflesh Skin 0-->skin is smooth   COWS Score 13     Chemical Dependency Addendum - 05/28/24 1229    Patient feels alcohol/drug use is a problem for Himself/Herself   Patient reports others have expressed concern about patient's alcohol/drug use Yes   Patient's desired goal Abstinence   The amount required to get the desired effect has Increased   Memory loss/blackouts during use No   Has patient previously tried to quit/cut back? Yes   Last attempt and results january 7 months sober   Patient's level of awareness of the relationship between behavioral conditions and pattern of substance abuse Some insight   Current withdrawal symptoms Tremors;Sweats/diaphoretic;Elevated blood pressure;Nausea;Body aches   Patient has experienced DTs Sweats/diaphoresis   Patient has attended AA/NA No   Patient has sober support system Yes   Patient has a sponsor No   When was patient's last drink? denies   Longest period of sobriety? 7 months   Patient rated craving level 7   Has patient ever accidentally overdosed on drugs? No     Psychosocial Assessment - 05/28/24 1230    General Appearance WDL   General Appearance WDL WDL   Activity WDL   Activity WDL ex;activity   Activity restlessness;tremors   Daily Functioning WDL   Daily functioning WDL daily functioning   Daily functioning exceptions poor appetite;poor sleep, terminal insomnia   Speech WDL   Speech WDL WDL   Attitude WDL   Attitude WDL WDL   Thought Process WDL   Thought Process WDL WDL   Perceptual State WDL   Perceptual State WDL WDL   Emotion Mood WDL   Emotion/Mood/Affect WDL X;emotion mood   Emotion/Mood anxious;depressed   Patient rated depression level 7   Patient rated anxiety level 10   Cognitive Function WDL   Cognitive function WDL WDL   Functional Status   Usual Activity Tolerance good   Current Activity Tolerance good     Safety - 05/28/24 1231    Is patient prone to violence? No   Does patient have a history of violent behavior?  No   Has patient ever set fires or been accused of setting fires? No   Does patient have relatives working in the facility? No   Does patient know other patients in the facility? No   Who does patient want notified if they are restrained/secluded? denies   Release signed for notification? No   Does patient have any medical conditions/disabilities/limitations that would place them at greater risk during restraint or seclusion? No     Vital Signs (last 2 days)    Date/Time Temp Pulse Resp BP Patient Position Device (Oxygen Therapy) SpO2   05/30/24 0752 -- -- -- -- -- room air --   05/30/24 0200 97.1 (36.2) 93 18 118/78 Lying room air 99   05/29/24 2047 98.2 (36.8) 90 18 138/84 Sitting room air 100   05/29/24 1905 -- -- -- -- -- room air --   05/29/24 1200 97.3 (36.3) 96 18 116/74 Lying room air 100   05/29/24 0907 -- 101 -- 136/89  Sitting -- --   BP: Pt recieved PRN medication which brought blood pressure to within acceptable parameters at 05/29/24 0907   05/29/24 0816 -- -- -- -- -- room air --   05/29/24 0800 97.6 (36.4) 89 18 132/90 Sitting room air 99   05/29/24 0430 97.6 (36.4) 88 18 101/63 Lying room air 97   05/29/24 0030 97.1 (36.2) 73 16 121/76 Sitting room air 97   05/28/24 2050 97.6 (36.4) 80 18 113/72 Sitting room air 97   05/28/24 1605 -- 74 -- 109/71 Sitting -- --   05/28/24 1505 -- 79 -- 138/91 -- -- --   05/28/24 1345 97.8 (36.6) 98 16 131/85 Sitting room air 99     5/28/2024 1121 05/28/2024 1142 Urine Drug Screen - Urine, Clean Catch [[id number]]    (Abnormal)   Urine, Clean Catch    Final result Component Value   THC, Screen, Urine Negative   Phencyclidine (PCP), Urine Negative   Cocaine Screen, Urine Positive Abnormal    Methamphetamine, Ur Negative   Opiate Screen Negative   Amphetamine Screen, Urine Negative   Benzodiazepine Screen, Urine Negative   Tricyclic Antidepressants Screen Negative   Methadone Screen, Urine Negative   Barbiturates Screen, Urine Negative   Oxycodone Screen, Urine Negative    Buprenorphine, Screen, Urine Negative          05/28/2024 1121 05/28/2024 1144 Fentanyl, Urine - Urine, Clean Catch [[id number]]    (Abnormal)   Urine, Clean Catch    Final result Component Value   Fentanyl, Urine Positive Abnormal         PRN MEDICATIONS UTILIZED:    cloNIDine (CATAPRES) tablet 0.1 mg  Dose: 0.1 mg  Freq: 4 Times Daily PRN Route: PO  PRN Comment: See Administration Instructions  Start: 05/29/24 0800 End: 05/30/24 0759      RECEIVED 5/29/24 AT 0907  cloNIDine (CATAPRES) tablet 0.1 mg  Dose: 0.1 mg  Freq: 4 Times Daily PRN Route: PO  PRN Comment: See Administration Instructions  Start: 05/28/24 1351 End: 05/29/24 0759  RECEIVED 5/28/24 AT 1505 AND 2150  cyclobenzaprine (FLEXERIL) tablet 10 mg  Dose: 10 mg  Freq: 3 Times Daily PRN Route: PO  PRN Reason: Muscle Spasms  PRN Comment: Muscle Cramps  Indications of Use: MUSCLE SPASM  Start: 05/28/24 1351 End: 06/02/24 1350   RECEIVED 5/28/24 AT 1505  RECEIVED 5/29/24 AT 0907 AND 2128  RECEIVED 5/30/24 AT 0832  dicyclomine (BENTYL) capsule 10 mg  Dose: 10 mg  Freq: 3 Times Daily PRN Route: PO  PRN Comment: Abdominal Cramps  Start: 05/28/24 1351 End: 06/02/24 1350  RECEIVED 5/30/24 AT 0832  hydrOXYzine (ATARAX) tablet 50 mg  Dose: 50 mg  Freq: Every 6 Hours PRN Route: PO  PRN Reason: Anxiety  Start: 05/28/24 1351   RECEIVED 5/28/24 AT 1505  RECEIVED 5/30/24 AT 0832  ibuprofen (ADVIL,MOTRIN) tablet 400 mg  Dose: 400 mg  Freq: Every 6 Hours PRN Route: PO  PRN Reasons: Mild Pain,Moderate Pain  PRN Comment: Severe Pain (7-10)  Start: 05/28/24 1351   RECEIVED 5/28/24 AT 1505 AND 2150  RECEIVED 5/30/24 AT 0832  traZODone (DESYREL) tablet 50 mg  Dose: 50 mg  Freq: Nightly PRN Route: PO  PRN Reason: Sleep  Start: 05/28/24 1351  RECEIVED 5/28/24 AT 2150  RECEIVED 5/29/24 AT 2128    NURSING NOTE: 5/28/24    Presented to ED from [facility] (where he has been x 1 day) requesting detox. Reports that he has had a substance problem since he was 19 years old. Most  recently has been using 1 gram of fentanyl and 1 gram of cocaine intranasally daily. Three prior detox admissions (none here). Longest period of sobriety was 7 months. Plans to return to [facility] upon discharge.   Last edited by Tia Hall RN on 24 at 1518     H&P:        Keily Resendiz MD   Physician  Psychiatry     H&P      Signed     Date of Service: 24 1249  Creation Time: 24     Signed       Expand All Collapse All    INITIAL PSYCHIATRIC HISTORY & PHYSICAL     Patient Identification:  Name:   Harpal Poe  Age:   28 y.o.  Sex:   male  :   1995  MRN:   [id number]  Visit Number:   [id number]  Primary Care Physician:   Provider, No Known     SUBJECTIVE     CC/Focus of Exam detox     HPI: Harpal Poe is a 28 y.o. male who was admitted on 2024 with complaints of drug use and withdrawals. The patient reports a long history of substance use. First use was 19 years old. Over time the use increased and the patient  continued to use despite negative consequences including relationship problems, social and financial problems. The patient endorses symptoms of tolerance and withdrawals and ongoing cravings to use. Has tried to cut down and stop but has not been successful. Spends too much time and resources in pursuit of substance use. Longest period of sobriety is reported to be 7 months.  Currently using fentanyl, cocaine each a gram daily via snorting.   Last use 2024  Withdrawal symptoms body aches, headaches, restlessness, chills, sweats  Patient denies any alcohol abuse.  Patient states that he uses tobacco.  Patient denies any history of seizures with withdrawal.  Patient states he got bored and relapsed.  Patient states finances as a stressor in his life.  Patient denies any history of physical, mental, or sexual abuse.  Patient rates his appetite as poor.  Patient rates his sleep as poor.  Patient states that he has nightmares.  Patient rates his  anxiety on a scale of 1-10 with 10 being the most severe a 10.  Patient rates his depression on a scale of 1-10 with 10 being the most severe a 7.  Patient rates his cravings on a scale of 1-10 with 10 being the most severe a 10.  Patient's COWS was 13.  Patient denies any suicidal ideation.  Patient denies any homicidal ideation.  Patient denies any hallucinations.  Patient was admitted to [facility] psychiatry for further safety and stabilization.     Available medical/psychiatric records reviewed and incorporated into the current document.      PAST PSYCHIATRIC HX: Patient has had no prior admissions.  Patient denies any outpatient care.     SUBSTANCE USE HX: UDS was positive for fentanyl, cocaine.  See HPI for current use.     SOCIAL HX: Patient states he was born and raised in [facility].  Patient states he currently resides with his mother in [place].  Patient states he is single and has 1 daughter that lives with her paternal grandmother.  Patient states he is currently unemployed.  Patient states he has a high school diploma.  Patient denies any legal issues.     Medical History        Past Medical History:   Diagnosis Date    Substance abuse              Surgical History         Past Surgical History:   Procedure Laterality Date    NO PAST SURGERIES                History reviewed. No pertinent family history.        Prescriptions Prior to Admission   No medications prior to admission.                  ALLERGIES:  Patient has no known allergies.     Temp:  [97.1 °F (36.2 °C)-97.6 °F (36.4 °C)] 97.3 °F (36.3 °C)  Heart Rate:  [] 96  Resp:  [16-18] 18  BP: (101-136)/(63-90) 116/74     REVIEW OF SYSTEMS:  Review of Systems   Constitutional:  Positive for chills, diaphoresis and fatigue.   HENT: Negative.  Positive for rhinorrhea.    Eyes: Negative.    Respiratory: Negative.     Cardiovascular: Negative.    Gastrointestinal:  Positive for nausea.   Endocrine: Negative.     Genitourinary: Negative.    Musculoskeletal: Negative.  Positive for arthralgias and myalgias.   Skin: Negative.    Allergic/Immunologic: Negative.    Neurological:  Positive for weakness.   Hematological: Negative.    Psychiatric/Behavioral:  Positive for dysphoric mood. The patient is nervous/anxious.       See HPI for psychiatric ROS  OBJECTIVE    PHYSICAL EXAM:  Physical Exam  Constitutional:  Appears well-developed and well-nourished.   HENT:   Head: Normocephalic and atraumatic.   Right Ear: External ear normal.   Left Ear: External ear normal.   Mouth/Throat: Oropharynx is clear and moist.   Eyes: Pupils are equal, round, and reactive to light. Conjunctivae and EOM are normal.   Neck: Normal range of motion. Neck supple.   Cardiovascular: Normal rate, regular rhythm and normal heart sounds.    Respiratory: Effort normal and breath sounds normal. No respiratory distress. No wheezes.   GI: Soft. Bowel sounds are normal.No distension. There is no tenderness.   Musculoskeletal: Normal range of motion. No edema or deformity.   Neurological:No cranial nerve deficit. Coordination normal.   Skin: Skin is warm and dry. No rash noted. No erythema.      MENTAL STATUS EXAM:               Hygiene:   fair  Cooperation:  Cooperative  Eye Contact:  Good  Psychomotor Behavior:  Appropriate  Affect:  Appropriate  Hopelessness: 5  Speech:  Normal  Linear  Thought Content:  Normal  Suicidal:  None  Homicidal:  None  Hallucinations:  None  Delusion:  None  Memory:  Intact  Orientation:  Person, Place, Time, and Situation  Reliability:  fair  Insight:  Fair  Judgement:  Poor  Impulse Control:  Poor        Imaging Results (Last 24 Hours)         ** No results found for the last 24 hours. **                ECG/EMG Results (most recent)         Procedure Component Value Units Date/Time     ECG 12 Lead Other; Baseline Cardiac Status [[id number]] Collected: 05/28/24 1642       Updated: 05/29/24 0947       QT Interval 360 ms          QTC Interval 391 ms       Narrative:       Test Reason : Other~  Blood Pressure :   */*   mmHG  Vent. Rate :  71 BPM     Atrial Rate :  71 BPM     P-R Int : 168 ms          QRS Dur :  88 ms      QT Int : 360 ms       P-R-T Axes :  77  83  61 degrees     QTc Int : 391 ms     Normal sinus rhythm  Nonspecific ST abnormality  Abnormal ECG  When compared with ECG of 28-MAY-2024 16:42, (Unconfirmed)  No significant change was found  Confirmed by Angie Rushing ([id number]) on 5/29/2024 9:47:17 AM     Referred By: FRANSICO           Confirmed By: Angie Rushing                      Lab Results   Component Value Date     GLUCOSE 103 (H) 05/28/2024     BUN 11 05/28/2024     CREATININE 0.90 05/28/2024     BCR 12.2 05/28/2024     CO2 28.5 05/28/2024     CALCIUM 9.9 05/28/2024     ALBUMIN 4.3 05/28/2024     AST 16 05/28/2024     ALT 20 05/28/2024               Lab Results   Component Value Date     WBC 8.29 05/28/2024     HGB 14.2 05/28/2024     HCT 44.8 05/28/2024     MCV 86.7 05/28/2024      05/28/2024         Pain Management Panel               Latest Ref Rng & Units 5/28/2024   Pain Management Panel   Amphetamine, Urine Qual Negative Negative    Barbiturates Screen, Urine Negative Negative    Benzodiazepine Screen, Urine Negative Negative    Buprenorphine, Screen, Urine Negative Negative    Cocaine Screen, Urine Negative Positive    Fentanyl, Urine Negative Positive    Methadone Screen , Urine Negative Negative    Methamphetamine, Ur Negative Negative             Brief Urine Lab Results  (Last result in the past 365 days)          Color   Clarity   Blood   Leuk Est   Nitrite   Protein   CREAT   Urine HCG         05/28/24 1121 Yellow    Cloudy    Negative    Negative    Negative    Negative                             Reviewed labs and studies done with this admission.         ASSESSMENT & PLAN:          Opioid use disorder, severe, dependence  -Clonidine detox  -Comfort meds       Cocaine use disorder, severe,  dependence  -Supportive treatment       Nicotine use disorder  -Encouraged cessation     Hospital bed: No     The patient has been admitted for safety and stabilization.  Patient will be monitored for suicidality daily and maintained on Special Precautions Level 4 (q30 min checks).  The patient will have individual and group therapy with a master's level therapist. A master treatment plan will be developed and agreed upon by the patient and his/her treatment team.  The patient's estimated length of stay in the hospital is 5-7 days.         Written by Elba Amezcua acting as scribe for Dr.Mazhar Resendiz signature on this note affirms that the note adequately documents the care provided.   This note was generated using a scribe,   Elba Amezcua MA  05/29/24  12:49 PM EDT     I, Keily Resendiz MD, personally performed the services described in this documentation as scribed by the above named individual in my presence, and it is both accurate and complete.                       THERAPY NOTE:  5/29/24     Estephania Webb CSW     Case Management     Plan of Care      Signed     Date of Service: 05/29/24 0930  Creation Time: 05/29/24 0930     Signed         Goal Outcome Evaluation:  Problem: Adult Behavioral Health Plan of Care  Goal: Patient-Specific Goal (Individualization)  Outcome: Ongoing, Progressing  Flowsheets  Taken 5/29/2024 0929 by Estephania Webb CSW  Patient-Specific Goals (Include Timeframe): Identify 2-3 coping skills, address relapse prevention methods, complete aftercare plans, and deny SI/HI prior to discharge.  Individualized Care Needs: Therapist to offer 1-4 therapy sessions, aftercare planning, safety planning, family education, group therapy, and brief CBT/MI interventions.  Taken 5/29/2024 0923 by Estephania Webb CSW  Patient Personal Strengths:   resilient   resourceful   motivated for recovery   motivated for treatment   family/social support   stable living  environment  Patient Vulnerabilities:   occupational insecurity   history of unsuccessful treatment   poor impulse control   lacks insight into illness   substance abuse/addiction  Taken 5/28/2024 1504 by Tia Hall RN  Anxieties, Fears or Concerns: None verbalized  Goal: Optimized Coping Skills in Response to Life Stressors  Outcome: Ongoing, Progressing  Flowsheets (Taken 5/29/2024 0929)  Optimized Coping Skills in Response to Life Stressors: making progress toward outcome  Intervention: Promote Effective Coping Strategies  Flowsheets (Taken 5/29/2024 0929)  Supportive Measures:   active listening utilized   counseling provided   decision-making supported   goal-setting facilitated   verbalization of feelings encouraged   self-responsibility promoted   self-reflection promoted   positive reinforcement provided   self-care encouraged  Goal: Develops/Participates in [facility] to Support Successful Transition  Outcome: Ongoing, Progressing  Flowsheets (Taken 5/29/2024 0929)  Develops/Participates in [facility] [place] to Support Successful Transition: making progress toward outcome  Intervention: Foster [facility] [place]  Flowsheets (Taken 5/29/2024 0929)  Trust Relationship/Rapport:   care explained   questions encouraged   choices provided   reassurance provided   emotional support provided   thoughts/feelings acknowledged   empathic listening provided   questions answered  Intervention: Mutually Develop Transition Plan  Flowsheets  Taken 5/29/2024 0929  Outpatient/Agency/Support Group Needs: residential services  Transition Support:   follow-up care discussed   follow-up care coordinated   community resources reviewed   crisis management plan promoted   crisis management plan verbalized  Anticipated Discharge Disposition: residential substance use unit  Taken 5/29/2024 0927  Discharge Coordination/Progress: Patient has [employer] and [employer]. Therapist met with patient to  complete assessment. Patient presents from [facility].  Transportation Anticipated: agency  Transportation Concerns: none  Current Discharge Risk: substance use/abuse  Concerns to be Addressed:   substance/tobacco abuse/use   coping/stress   cognitive/perceptual   mental health   discharge planning  Readmission Within the Last 30 Days: no previous admission in last 30 days  Patient/Family Anticipated Services at Transition: rehabilitation services  Patient's Choice of Community Agency(s): [facility]  Patient/Family Anticipates Transition to: inpatient rehabilitation facility  Offered/Gave Vendor List: no      DATA:      Therapist met individually with patient this date to introduce role and to discuss hospitalization expectations. Patient agreeable.      Patient has signed consent for [facility].     Clinical Maneuvering/Intervention:     Therapist assisted patient in processing session content; acknowledged and normalized patient’s thoughts, feelings, and concerns. Discussed the therapist/patient relationship and explain the parameters and limitations of relative confidentiality. Also discussed the importance of active participation, and honesty to the treatment process. Encouraged the patient to discuss/vent their feelings, frustrations, and fears concerning their ongoing medical issues and validated their feelings.     Discussed the importance of finding enjoyable activities and coping skills that the patient can engage in a regular basis. Discussed healthy coping skills such as distraction, self love, grounding, thought challenges/reframing, etc. Provided patient with list of healthy coping skills this date. Discussed the importance of medication compliance. Praised the patient for seeking help and spent the majority of the session building rapport.       Allowed patient to freely discuss issues without interruption or judgment. Provided safe, confidential environment to facilitate the  development of positive therapeutic relationship and encourage open, honest communication.      Therapist addressed discharge safety planning this date. Assisted patient in identifying risk factors which would indicate the need for higher level of care after discharge;  including thoughts to harm self or others and/or self-harming behavior. Encouraged patient to call 911, or present to the nearest emergency room should any of these events occur. Discussed crisis intervention services and means to access. Encouraged securing any objects of harm.       Therapist completed integrated summary, treatment plan, and initiated social history this date. Therapist is strongly encouraging family involvement in treatment.       ASSESSMENT:      The patient is a 27 y/o male admitted for detox treatment. Therapist met with patient on this date to complete assessment. Patient reports high anxiety and depression, denies SI/HI/AVH. He reports use of fentanyl and cocaine. Reports experiencing sweats, restlessness, chills, and body aches. Patient has had no past [facility] admissions. Longest period of sobriety has been 7 months. Patient normally lives at home with his mother. Presents from [facility] at this time and plans to return. Agency will provide transportation at discharge.     PLAN:       Patient to remain hospitalized this date.      Treatment team will focus efforts on stabilizing patient's acute symptoms while providing education on healthy coping and crisis management to reduce hospitalizations. Patient requires daily psychiatrist evaluation and 24/7 nursing supervision to promote patient safety.     Therapist will offer 1-4 individual sessions, 1 therapy group daily, family education, and appropriate referral.     Therapist recommends [facility] residential rehab.

## 2024-05-30 NOTE — PLAN OF CARE
Goal Outcome Evaluation:  Plan of Care Reviewed With: patient  Patient Agreement with Plan of Care: agrees     Progress: improving  Outcome Evaluation: Pt has been calm and cooperative throughout shift, is preparing to be discharged back to [facility]. Pt states sleep and appetite are good, rates anxiety 5, depression 0. Pt denies SI/HI/AVH, rates cravings 5, c/o cold chills.

## 2024-05-30 NOTE — PLAN OF CARE
Goal Outcome Evaluation:  Plan of Care Reviewed With: patient  Patient Agreement with Plan of Care: agrees     Pt reports feeling better. Denies cravings. Rates generalized soreness as 10. Educated on prn meds. Anxiety 3/10. Depression 0/10. Denies issues with appetite or sleep.        Pt appeared to have slept approx 8 hrs this shift. Responding well to meds.

## 2024-05-31 NOTE — PAYOR COMM NOTE
Harpal Poe (28 y.o. Male)                                          Behavioral Health Discharge Summary             Please fax within 24 hours of discharge to [facility] at: [phone]      Member Name: Harpal Poe Member ID: [id number]   Authorization Number: [id number] Phone: [phone]   Member Address: [address] [place] [address]   Discharge Date: 05/30/2024 Level of Care at Discharge:  F/U WITH RESIDENTIAL   Facility: [facility] Staff Completing Form: LINDY DEL TORO   If the member is being discharged directly to a residential or extended care program, please specify the type below.   __Private Child-Caring Facility (PCC) Residential/Group Home   __Private Child-Caring Facility (PCC) Therapeutic Foster Care   __Residential Treatment Facility (RTF)   __Psychiatric Residential Treatment Facility (PRTF I or II)   __Long-Term Acute Inpatient Hospital Services or Extended Care Unit (ECU)   __Other (please specify):    Brief discharge summary of treatment received (for follow up by the case management team): D/C clinical with list of medications and follow up appts given to patient upon discharge.     BRIEF SUMMARY OF RECOMMENDATIONS FOR ONGOING TREATMENT     Discharged to where: [facility]   Discharge diagnoses: Opioid use disorder, severe, dependence (F11.20)    Cocaine use disorder, severe, dependence (F14.20)   Axis I:    Axis II:    Axis III:    Axis IV:    Axis V:    Does the member understand his/her DX?  Yes          Medication     Dose     Schedule Supply/  Quantity  Given at Discharge RX Provided  Yes/No  If Rx Provided, Quantity RX Prior Auth Required  Yes/No Prior Auth  Completed   NONE                                                                                          Does the member understand the reason for taking these medications? Yes                                                           FOLLOW-UP APPOINTMENTS   Please schedule within 7 days of  "discharge and provide appointment details for all referred services.    PCP/Other Providers Involved in Treatment:    Appointment Type:   RESIDENTIAL Provider Name:   [facility] Provider Phone:   [phone]   Appointment Date:   5/30/2024 Appointment Time:   UPON ARRIVAL     Assessment   (new to OP services)        Case Management    Is the member already enrolled in case management?  Yes/No  If yes, date the CM was notified:    If no, was the CM referral offered?  Yes/No  Accepted? Yes/No    Is the Release of Information in the chart? Yes/No:      Medication Management (for member discharged with psychiatric medications):      A&D Treatment (for member with substance abuse/   dependence in the past year):      Medical Condition (for member with a medical condition):    Other recommended treatment:    Do you have any concerns about the discharge plan?  No    If yes, explain:    Was the member involved in the discharge planning?  Yes    If no, explain:    Was a copy of the discharge plan provided to the member?  Yes    If no, explain:                Date of Birth   1995    Social Security Number       [place] [id number]    Home Phone   [phone]    N   [id number]       [facility]    Marital Status   Single                            Admission Date   5/28/24    Admission Type   Emergency    Admitting Provider   Quincy Charles MD    Attending Provider       Department, Room/Bed   [facility] ADULT CD, 1039/1S       Discharge Date   5/30/2024    Discharge Disposition   Rehab Facility or Unit (DC - External)    Discharge Destination                                 Attending Provider: (none)   Allergies: No Known Allergies    Isolation: None   Infection: None   Code Status: Prior    Ht: 180.3 cm (71\")   Wt: 61.7 kg (136 lb)    Admission Cmt: None   Principal Problem: Opioid use disorder, severe, dependence [F11.20]                   Active " Insurance as of 5/28/2024       Primary Coverage       Payor Plan Insurance Group Employer/Plan Group    [employer] [place] [employer] [place]       Payor Plan Address Payor Plan Phone Number Payor Plan Fax Number Effective Dates    [address] [phone]  1/1/2024 - None Entered    [facility] [address]         Subscriber Name Subscriber Birth Date Member ID       MICHELLECLINT 1995 [id number]               Secondary Coverage       Payor Plan Insurance Group Employer/Plan Group    [facility] MEDICAID        Payor Plan Address Payor Plan Phone Number Payor Plan Fax Number Effective Dates    [address] [address] [phone]  5/28/2024 - None Entered    [facility] [id number]         Subscriber Name Subscriber Birth Date Member ID       CLINT MARTINEZ 1995 [id number]                     Emergency Contacts        (Rel.) Home Phone Work Phone Mobile Phone    ZOILA SHERMAN (Mother) [phone] -- --

## 2024-05-31 NOTE — PAYOR COMM NOTE
"Harpal Poe (28 y.o. Male)       Date of Birth   1995    Social Security Number       Address   [address] [place] [address]    Home Phone   [phone]    MRN   [id number]       Synagogue   [employer]    Marital Status   Single                            Admission Date   5/28/24    Admission Type   Emergency    Admitting Provider   Quincy Charles MD    Attending Provider       Department, Room/Bed   [facility] ADULT CD, 1039/1S       Discharge Date   5/30/2024    Discharge Disposition   Rehab Facility or Unit (DC - External)    Discharge Destination                                 Attending Provider: (none)   Allergies: No Known Allergies    Isolation: None   Infection: None   Code Status: Prior    Ht: 180.3 cm (71\")   Wt: 61.7 kg (136 lb)    Admission Cmt: None   Principal Problem: Opioid use disorder, severe, dependence [F11.20]                   Active Insurance as of 5/28/2024       Primary Coverage       Payor Plan Insurance Group Employer/Plan Group    [employer] [place] [employer] [place]       Payor Plan Address Payor Plan Phone Number Payor Plan Fax Number Effective Dates    [address] [phone]  1/1/2024 - None Entered    [facility] [address]         Subscriber Name Subscriber Birth Date Member ID       HARPAL POE 1995 [id number]               Secondary Coverage       Payor Plan Insurance Group Employer/Plan Group    [facility]        Payor Plan Address Payor Plan Phone Number Payor Plan Fax Number Effective Dates    [address] [address] [phone]  5/28/2024 - None Entered    [facility] [id number]         Subscriber Name Subscriber Birth Date Member ID       HARPAL POE 1995 [id number]                     Emergency Contacts        (Rel.) Home Phone Work Phone Mobile Phone    LANEZOILA MUÑOZ (Mother) [phone] -- --        RETURN FAX:  [phone]    PLEASE ATTACH THIS DISCHARGE " "INFORMATION TO AUTHORIZATION # [id number]    RE:  CLINT POE  :  1995  ID:  [id number]    ADMISSION DATE:  2024  DISCHARGE DATE:  2024    DISCHARGE DIAGNOSIS:    Principal Problem:    Opioid use disorder, severe, dependence (F11.20)  Active Problems:    Cocaine use disorder, severe, dependence (F14.20)    AFTERCARE:    [facility]   [facility]  [phone]     2024     DISCHARGE SUMMARY:     Keily Resendiz MD   Physician  Psychiatry     Discharge Summary      Signed     Date of Service: 24  Creation Time: 24     Signed         :  1995  MRN:  [id number]  Visit Number:  [id number]        Date of Admission:2024   Date of Discharge:  2024     Discharge Diagnosis:  Principal Problem:    Opioid use disorder, severe, dependence  Active Problems:    Cocaine use disorder, severe, dependence           Admission Diagnosis:  Substance abuse [F19.10]           BECK Poe is a 28 y.o. male who was admitted on 2024 with complaints of drug use and withdrawals.   For details please see H&P dated 24.      Hospital Course  Patient is a 28 y.o. male presented with opioid and cocaine use and withdrawals. The patient was admitted to the detox recovery unit and started on clonidine detox. The patient reported feeling better and didn't experience or exhibit any active withdrawals and by 24 he felt ready to back to residential rehab.        Mental Status Exam upon discharge:   Mood \"good\"   Affect-congruent, appropriate, stable  Thought Content-goal directed, no delusional material present  Thought process-linear, organized.  Suicidality: No SI  Homicidality: No HI  Perception: No AH/VH     Procedures Performed        Consults:   Consults         No orders found from 2024 to 2024.                Pertinent Test Results:           Admission on 2024   Component Date Value Ref Range Status    Hepatitis B Surface Ag " 05/28/2024 Non-Reactive  Non-Reactive Final    Hep A IgM 05/28/2024 Non-Reactive  Non-Reactive Final    Hep B C IgM 05/28/2024 Non-Reactive  Non-Reactive Final    Hepatitis C Ab 05/28/2024 Non-Reactive  Non-Reactive Final    QT Interval 05/28/2024 360  ms Final    QTC Interval 05/28/2024 391  ms Final   Admission on 05/28/2024, Discharged on 05/28/2024   Component Date Value Ref Range Status    Glucose 05/28/2024 103 (H)  65 - 99 mg/dL Final    BUN 05/28/2024 11  6 - 20 mg/dL Final    Creatinine 05/28/2024 0.90  0.76 - 1.27 mg/dL Final    Sodium 05/28/2024 140  136 - 145 mmol/L Final    Potassium 05/28/2024 4.2  3.5 - 5.2 mmol/L Final     Slight hemolysis detected by analyzer. Result may be falsely elevated.    Chloride 05/28/2024 105  98 - 107 mmol/L Final    CO2 05/28/2024 28.5  22.0 - 29.0 mmol/L Final    Calcium 05/28/2024 9.9  8.6 - 10.5 mg/dL Final    Total Protein 05/28/2024 7.4  6.0 - 8.5 g/dL Final    Albumin 05/28/2024 4.3  3.5 - 5.2 g/dL Final    ALT (SGPT) 05/28/2024 20  1 - 41 U/L Final    AST (SGOT) 05/28/2024 16  1 - 40 U/L Final    Alkaline Phosphatase 05/28/2024 83  39 - 117 U/L Final    Total Bilirubin 05/28/2024 0.3  0.0 - 1.2 mg/dL Final    Globulin 05/28/2024 3.1  gm/dL Final    A/G Ratio 05/28/2024 1.4  g/dL Final    BUN/Creatinine Ratio 05/28/2024 12.2  7.0 - 25.0 Final    Anion Gap 05/28/2024 6.5  5.0 - 15.0 mmol/L Final    eGFR 05/28/2024 118.6  >60.0 mL/min/1.73 Final    Color, UA 05/28/2024 Yellow  Yellow, Straw Final    Appearance, UA 05/28/2024 Cloudy (A)  Clear Final    pH, UA 05/28/2024 8.0  5.0 - 8.0 Final    Specific Gravity, UA 05/28/2024 1.023  1.005 - 1.030 Final    Glucose, UA 05/28/2024 Negative  Negative Final    Ketones, UA 05/28/2024 Negative  Negative Final    Bilirubin, UA 05/28/2024 Negative  Negative Final    Blood, UA 05/28/2024 Negative  Negative Final    Protein, UA 05/28/2024 Negative  Negative Final    Leuk Esterase, UA 05/28/2024 Negative  Negative Final    Nitrite,  UA 05/28/2024 Negative  Negative Final    Urobilinogen, UA 05/28/2024 1.0 E.U./dL  0.2 - 1.0 E.U./dL Final    Ethanol 05/28/2024 <10  0 - 10 mg/dL Final    Ethanol % 05/28/2024 <0.010  % Final    THC, Screen, Urine 05/28/2024 Negative  Negative Final    Phencyclidine (PCP), Urine 05/28/2024 Negative  Negative Final    Cocaine Screen, Urine 05/28/2024 Positive (A)  Negative Final    Methamphetamine, Ur 05/28/2024 Negative  Negative Final    Opiate Screen 05/28/2024 Negative  Negative Final    Amphetamine Screen, Urine 05/28/2024 Negative  Negative Final    Benzodiazepine Screen, Urine 05/28/2024 Negative  Negative Final    Tricyclic Antidepressants Screen 05/28/2024 Negative  Negative Final    Methadone Screen, Urine 05/28/2024 Negative  Negative Final    Barbiturates Screen, Urine 05/28/2024 Negative  Negative Final    Oxycodone Screen, Urine 05/28/2024 Negative  Negative Final    Buprenorphine, Screen, Urine 05/28/2024 Negative  Negative Final    Magnesium 05/28/2024 2.2  1.6 - 2.6 mg/dL Final    WBC 05/28/2024 8.29  3.40 - 10.80 10*3/mm3 Final    RBC 05/28/2024 5.17  4.14 - 5.80 10*6/mm3 Final    Hemoglobin 05/28/2024 14.2  13.0 - 17.7 g/dL Final    Hematocrit 05/28/2024 44.8  37.5 - 51.0 % Final    MCV 05/28/2024 86.7  79.0 - 97.0 fL Final    MCH 05/28/2024 27.5  26.6 - 33.0 pg Final    MCHC 05/28/2024 31.7  31.5 - 35.7 g/dL Final    RDW 05/28/2024 13.6  12.3 - 15.4 % Final    RDW-SD 05/28/2024 43.0  37.0 - 54.0 fl Final    MPV 05/28/2024 8.8  6.0 - 12.0 fL Final    Platelets 05/28/2024 263  140 - 450 10*3/mm3 Final    Neutrophil % 05/28/2024 53.0  42.7 - 76.0 % Final    Lymphocyte % 05/28/2024 37.0  19.6 - 45.3 % Final    Monocyte % 05/28/2024 7.1  5.0 - 12.0 % Final    Eosinophil % 05/28/2024 2.2  0.3 - 6.2 % Final    Basophil % 05/28/2024 0.6  0.0 - 1.5 % Final    Immature Grans % 05/28/2024 0.1  0.0 - 0.5 % Final    Neutrophils, Absolute 05/28/2024 4.39  1.70 - 7.00 10*3/mm3 Final    Lymphocytes, Absolute  05/28/2024 3.07  0.70 - 3.10 10*3/mm3 Final    Monocytes, Absolute 05/28/2024 0.59  0.10 - 0.90 10*3/mm3 Final    Eosinophils, Absolute 05/28/2024 0.18  0.00 - 0.40 10*3/mm3 Final    Basophils, Absolute 05/28/2024 0.05  0.00 - 0.20 10*3/mm3 Final    Immature Grans, Absolute 05/28/2024 0.01  0.00 - 0.05 10*3/mm3 Final    nRBC 05/28/2024 0.0  0.0 - 0.2 /100 WBC Final    Fentanyl, Urine 05/28/2024 Positive (A)  Negative Final         Condition on Discharge:  improved     Vital Signs  Temp:  [97.1 °F (36.2 °C)-98.2 °F (36.8 °C)] 97.8 °F (36.6 °C)  Heart Rate:  [80-96] 80  Resp:  [16-18] 16  BP: (116-138)/(73-84) 119/73        Discharge Disposition:  Rehab Facility or Unit (DC - External)     Discharge Medications:      Discharge Medications       Patient Not Prescribed Medications Upon Discharge            Discharge Diet: Regular      Activity at Discharge: As tolerated      Follow-up Appointments  [facility]        Time: I spent  < 30  minutes on this discharge activity which included: face-to-face encounter with the patient, reviewing the data in the system, coordination of the care with the nursing staff as well as consultants, documentation, and entering orders.          Clinician:   Keily Resendiz MD  05/30/24  11:07 EDT

## 2024-10-21 ENCOUNTER — HOSPITAL ENCOUNTER (INPATIENT)
Facility: HOSPITAL | Age: 29
LOS: 1 days | Discharge: NURSING, REHAB OR LONG-TERM CARE | End: 2024-10-22
Attending: PSYCHIATRY & NEUROLOGY | Admitting: PSYCHIATRY & NEUROLOGY
Payer: COMMERCIAL

## 2024-10-21 ENCOUNTER — HOSPITAL ENCOUNTER (EMERGENCY)
Facility: HOSPITAL | Age: 29
Discharge: PSYCHIATRIC OR SUBSTANCE USE FACILITY | DRG: 897 | End: 2024-10-21
Attending: STUDENT IN AN ORGANIZED HEALTH CARE EDUCATION/TRAINING PROGRAM | Admitting: STUDENT IN AN ORGANIZED HEALTH CARE EDUCATION/TRAINING PROGRAM
Payer: COMMERCIAL

## 2024-10-21 VITALS
DIASTOLIC BLOOD PRESSURE: 89 MMHG | HEIGHT: 71 IN | HEART RATE: 96 BPM | BODY MASS INDEX: 18.2 KG/M2 | WEIGHT: 130 LBS | SYSTOLIC BLOOD PRESSURE: 132 MMHG | TEMPERATURE: 97.5 F | OXYGEN SATURATION: 98 % | RESPIRATION RATE: 18 BRPM

## 2024-10-21 DIAGNOSIS — F19.10 SUBSTANCE ABUSE: Primary | ICD-10-CM

## 2024-10-21 LAB
ALBUMIN SERPL-MCNC: 4.3 G/DL (ref 3.5–5.2)
ALBUMIN/GLOB SERPL: 1.4 G/DL
ALP SERPL-CCNC: 67 U/L (ref 39–117)
ALT SERPL W P-5'-P-CCNC: 18 U/L (ref 1–41)
AMPHET+METHAMPHET UR QL: NEGATIVE
AMPHETAMINES UR QL: NEGATIVE
ANION GAP SERPL CALCULATED.3IONS-SCNC: 5 MMOL/L (ref 5–15)
AST SERPL-CCNC: 33 U/L (ref 1–40)
BARBITURATES UR QL SCN: NEGATIVE
BASOPHILS # BLD AUTO: 0.01 10*3/MM3 (ref 0–0.2)
BASOPHILS NFR BLD AUTO: 0.2 % (ref 0–1.5)
BENZODIAZ UR QL SCN: NEGATIVE
BILIRUB SERPL-MCNC: 0.5 MG/DL (ref 0–1.2)
BILIRUB UR QL STRIP: NEGATIVE
BUN SERPL-MCNC: 9 MG/DL (ref 6–20)
BUN/CREAT SERPL: 10.2 (ref 7–25)
BUPRENORPHINE SERPL-MCNC: NEGATIVE NG/ML
CALCIUM SPEC-SCNC: 9.8 MG/DL (ref 8.6–10.5)
CANNABINOIDS SERPL QL: POSITIVE
CHLORIDE SERPL-SCNC: 103 MMOL/L (ref 98–107)
CLARITY UR: CLEAR
CO2 SERPL-SCNC: 29 MMOL/L (ref 22–29)
COCAINE UR QL: POSITIVE
COLOR UR: YELLOW
CREAT SERPL-MCNC: 0.88 MG/DL (ref 0.76–1.27)
DEPRECATED RDW RBC AUTO: 44.5 FL (ref 37–54)
EGFRCR SERPLBLD CKD-EPI 2021: 118.6 ML/MIN/1.73
EOSINOPHIL # BLD AUTO: 0.13 10*3/MM3 (ref 0–0.4)
EOSINOPHIL NFR BLD AUTO: 2 % (ref 0.3–6.2)
ERYTHROCYTE [DISTWIDTH] IN BLOOD BY AUTOMATED COUNT: 14 % (ref 12.3–15.4)
ETHANOL BLD-MCNC: <10 MG/DL (ref 0–10)
ETHANOL UR QL: <0.01 %
FENTANYL UR-MCNC: POSITIVE NG/ML
GLOBULIN UR ELPH-MCNC: 3 GM/DL
GLUCOSE SERPL-MCNC: 104 MG/DL (ref 65–99)
GLUCOSE UR STRIP-MCNC: NEGATIVE MG/DL
HAV IGM SERPL QL IA: NORMAL
HBV CORE IGM SERPL QL IA: NORMAL
HBV SURFACE AG SERPL QL IA: NORMAL
HCT VFR BLD AUTO: 42.3 % (ref 37.5–51)
HCV AB SER QL: NORMAL
HGB BLD-MCNC: 13.3 G/DL (ref 13–17.7)
HGB UR QL STRIP.AUTO: NEGATIVE
HIV 1+2 AB+HIV1 P24 AG SERPL QL IA: NORMAL
HOLD SPECIMEN: NORMAL
HOLD SPECIMEN: NORMAL
IMM GRANULOCYTES # BLD AUTO: 0.01 10*3/MM3 (ref 0–0.05)
IMM GRANULOCYTES NFR BLD AUTO: 0.2 % (ref 0–0.5)
KETONES UR QL STRIP: NEGATIVE
LEUKOCYTE ESTERASE UR QL STRIP.AUTO: NEGATIVE
LYMPHOCYTES # BLD AUTO: 1.58 10*3/MM3 (ref 0.7–3.1)
LYMPHOCYTES NFR BLD AUTO: 23.8 % (ref 19.6–45.3)
MAGNESIUM SERPL-MCNC: 1.9 MG/DL (ref 1.6–2.6)
MCH RBC QN AUTO: 27.3 PG (ref 26.6–33)
MCHC RBC AUTO-ENTMCNC: 31.4 G/DL (ref 31.5–35.7)
MCV RBC AUTO: 86.7 FL (ref 79–97)
METHADONE UR QL SCN: NEGATIVE
MONOCYTES # BLD AUTO: 0.28 10*3/MM3 (ref 0.1–0.9)
MONOCYTES NFR BLD AUTO: 4.2 % (ref 5–12)
NEUTROPHILS NFR BLD AUTO: 4.63 10*3/MM3 (ref 1.7–7)
NEUTROPHILS NFR BLD AUTO: 69.6 % (ref 42.7–76)
NITRITE UR QL STRIP: NEGATIVE
NRBC BLD AUTO-RTO: 0 /100 WBC (ref 0–0.2)
OPIATES UR QL: NEGATIVE
OXYCODONE UR QL SCN: NEGATIVE
PCP UR QL SCN: NEGATIVE
PH UR STRIP.AUTO: >=9 [PH] (ref 5–8)
PLATELET # BLD AUTO: 247 10*3/MM3 (ref 140–450)
PMV BLD AUTO: 8.9 FL (ref 6–12)
POTASSIUM SERPL-SCNC: 3.8 MMOL/L (ref 3.5–5.2)
PROT SERPL-MCNC: 7.3 G/DL (ref 6–8.5)
PROT UR QL STRIP: ABNORMAL
QT INTERVAL: 362 MS
QTC INTERVAL: 398 MS
RBC # BLD AUTO: 4.88 10*6/MM3 (ref 4.14–5.8)
SODIUM SERPL-SCNC: 137 MMOL/L (ref 136–145)
SP GR UR STRIP: 1.02 (ref 1–1.03)
TRICYCLICS UR QL SCN: NEGATIVE
UROBILINOGEN UR QL STRIP: ABNORMAL
WBC NRBC COR # BLD AUTO: 6.64 10*3/MM3 (ref 3.4–10.8)
WHOLE BLOOD HOLD COAG: NORMAL
WHOLE BLOOD HOLD SPECIMEN: NORMAL

## 2024-10-21 PROCEDURE — 63710000001 ONDANSETRON ODT 4 MG TABLET DISPERSIBLE: Performed by: PSYCHIATRY & NEUROLOGY

## 2024-10-21 PROCEDURE — G0432 EIA HIV-1/HIV-2 SCREEN: HCPCS | Performed by: PSYCHIATRY & NEUROLOGY

## 2024-10-21 PROCEDURE — 80074 ACUTE HEPATITIS PANEL: CPT | Performed by: PSYCHIATRY & NEUROLOGY

## 2024-10-21 PROCEDURE — 85025 COMPLETE CBC W/AUTO DIFF WBC: CPT | Performed by: PHYSICIAN ASSISTANT

## 2024-10-21 PROCEDURE — 99285 EMERGENCY DEPT VISIT HI MDM: CPT

## 2024-10-21 PROCEDURE — 80307 DRUG TEST PRSMV CHEM ANLYZR: CPT | Performed by: PHYSICIAN ASSISTANT

## 2024-10-21 PROCEDURE — 80053 COMPREHEN METABOLIC PANEL: CPT | Performed by: PHYSICIAN ASSISTANT

## 2024-10-21 PROCEDURE — 81003 URINALYSIS AUTO W/O SCOPE: CPT | Performed by: PHYSICIAN ASSISTANT

## 2024-10-21 PROCEDURE — 36415 COLL VENOUS BLD VENIPUNCTURE: CPT

## 2024-10-21 PROCEDURE — 82077 ASSAY SPEC XCP UR&BREATH IA: CPT | Performed by: PHYSICIAN ASSISTANT

## 2024-10-21 PROCEDURE — 93005 ELECTROCARDIOGRAM TRACING: CPT | Performed by: PSYCHIATRY & NEUROLOGY

## 2024-10-21 PROCEDURE — 83735 ASSAY OF MAGNESIUM: CPT | Performed by: PHYSICIAN ASSISTANT

## 2024-10-21 RX ORDER — CLONIDINE HYDROCHLORIDE 0.1 MG/1
0.1 TABLET ORAL 2 TIMES DAILY PRN
Status: DISCONTINUED | OUTPATIENT
Start: 2024-10-24 | End: 2024-10-22 | Stop reason: HOSPADM

## 2024-10-21 RX ORDER — CLONIDINE HYDROCHLORIDE 0.1 MG/1
0.1 TABLET ORAL 4 TIMES DAILY PRN
Status: DISPENSED | OUTPATIENT
Start: 2024-10-21 | End: 2024-10-22

## 2024-10-21 RX ORDER — IBUPROFEN 400 MG/1
400 TABLET, FILM COATED ORAL EVERY 6 HOURS PRN
Status: DISCONTINUED | OUTPATIENT
Start: 2024-10-21 | End: 2024-10-22 | Stop reason: HOSPADM

## 2024-10-21 RX ORDER — POLYETHYLENE GLYCOL 3350 17 G/17G
17 POWDER, FOR SOLUTION ORAL DAILY PRN
Status: DISCONTINUED | OUTPATIENT
Start: 2024-10-21 | End: 2024-10-22 | Stop reason: HOSPADM

## 2024-10-21 RX ORDER — CLONIDINE HYDROCHLORIDE 0.1 MG/1
0.1 TABLET ORAL 3 TIMES DAILY PRN
Status: DISCONTINUED | OUTPATIENT
Start: 2024-10-23 | End: 2024-10-22 | Stop reason: HOSPADM

## 2024-10-21 RX ORDER — BENZONATATE 100 MG/1
100 CAPSULE ORAL 3 TIMES DAILY PRN
Status: DISCONTINUED | OUTPATIENT
Start: 2024-10-21 | End: 2024-10-22 | Stop reason: HOSPADM

## 2024-10-21 RX ORDER — CLONIDINE HYDROCHLORIDE 0.1 MG/1
0.1 TABLET ORAL ONCE AS NEEDED
Status: DISCONTINUED | OUTPATIENT
Start: 2024-10-25 | End: 2024-10-22 | Stop reason: HOSPADM

## 2024-10-21 RX ORDER — ONDANSETRON 4 MG/1
4 TABLET, ORALLY DISINTEGRATING ORAL EVERY 6 HOURS PRN
Status: DISCONTINUED | OUTPATIENT
Start: 2024-10-21 | End: 2024-10-22 | Stop reason: HOSPADM

## 2024-10-21 RX ORDER — BENZTROPINE MESYLATE 1 MG/1
2 TABLET ORAL ONCE AS NEEDED
Status: DISCONTINUED | OUTPATIENT
Start: 2024-10-21 | End: 2024-10-22 | Stop reason: HOSPADM

## 2024-10-21 RX ORDER — BENZTROPINE MESYLATE 1 MG/ML
1 INJECTION, SOLUTION INTRAMUSCULAR; INTRAVENOUS ONCE AS NEEDED
Status: DISCONTINUED | OUTPATIENT
Start: 2024-10-21 | End: 2024-10-22 | Stop reason: HOSPADM

## 2024-10-21 RX ORDER — FAMOTIDINE 20 MG/1
20 TABLET, FILM COATED ORAL 2 TIMES DAILY PRN
Status: DISCONTINUED | OUTPATIENT
Start: 2024-10-21 | End: 2024-10-22 | Stop reason: HOSPADM

## 2024-10-21 RX ORDER — ACETAMINOPHEN 325 MG/1
650 TABLET ORAL EVERY 6 HOURS PRN
Status: DISCONTINUED | OUTPATIENT
Start: 2024-10-21 | End: 2024-10-22 | Stop reason: HOSPADM

## 2024-10-21 RX ORDER — LOPERAMIDE HCL 2 MG
2 CAPSULE ORAL
Status: DISCONTINUED | OUTPATIENT
Start: 2024-10-21 | End: 2024-10-22 | Stop reason: HOSPADM

## 2024-10-21 RX ORDER — TRAZODONE HYDROCHLORIDE 50 MG/1
50 TABLET, FILM COATED ORAL NIGHTLY PRN
Status: DISCONTINUED | OUTPATIENT
Start: 2024-10-21 | End: 2024-10-22 | Stop reason: HOSPADM

## 2024-10-21 RX ORDER — HYDRALAZINE HYDROCHLORIDE 25 MG/1
25 TABLET, FILM COATED ORAL DAILY PRN
Status: DISCONTINUED | OUTPATIENT
Start: 2024-10-21 | End: 2024-10-22 | Stop reason: HOSPADM

## 2024-10-21 RX ORDER — CYCLOBENZAPRINE HCL 10 MG
10 TABLET ORAL 3 TIMES DAILY PRN
Status: DISCONTINUED | OUTPATIENT
Start: 2024-10-21 | End: 2024-10-22 | Stop reason: HOSPADM

## 2024-10-21 RX ORDER — ALUMINA, MAGNESIA, AND SIMETHICONE 2400; 2400; 240 MG/30ML; MG/30ML; MG/30ML
15 SUSPENSION ORAL EVERY 6 HOURS PRN
Status: DISCONTINUED | OUTPATIENT
Start: 2024-10-21 | End: 2024-10-22 | Stop reason: HOSPADM

## 2024-10-21 RX ORDER — DICYCLOMINE HYDROCHLORIDE 10 MG/1
10 CAPSULE ORAL 3 TIMES DAILY PRN
Status: DISCONTINUED | OUTPATIENT
Start: 2024-10-21 | End: 2024-10-22 | Stop reason: HOSPADM

## 2024-10-21 RX ORDER — CLONIDINE HYDROCHLORIDE 0.1 MG/1
0.1 TABLET ORAL 4 TIMES DAILY PRN
Status: DISCONTINUED | OUTPATIENT
Start: 2024-10-22 | End: 2024-10-22 | Stop reason: HOSPADM

## 2024-10-21 RX ORDER — ECHINACEA PURPUREA EXTRACT 125 MG
2 TABLET ORAL AS NEEDED
Status: DISCONTINUED | OUTPATIENT
Start: 2024-10-21 | End: 2024-10-22 | Stop reason: HOSPADM

## 2024-10-21 RX ORDER — HYDROXYZINE HYDROCHLORIDE 50 MG/1
50 TABLET, FILM COATED ORAL EVERY 6 HOURS PRN
Status: DISCONTINUED | OUTPATIENT
Start: 2024-10-21 | End: 2024-10-22 | Stop reason: HOSPADM

## 2024-10-21 RX ADMIN — HYDROXYZINE HYDROCHLORIDE 50 MG: 50 TABLET, FILM COATED ORAL at 17:09

## 2024-10-21 RX ADMIN — CLONIDINE HYDROCHLORIDE 0.1 MG: 0.1 TABLET ORAL at 17:09

## 2024-10-21 RX ADMIN — CYCLOBENZAPRINE 10 MG: 10 TABLET, FILM COATED ORAL at 17:09

## 2024-10-21 RX ADMIN — TRAZODONE HYDROCHLORIDE 50 MG: 50 TABLET ORAL at 21:11

## 2024-10-21 RX ADMIN — IBUPROFEN 400 MG: 400 TABLET, FILM COATED ORAL at 17:08

## 2024-10-21 RX ADMIN — ONDANSETRON 4 MG: 4 TABLET, ORALLY DISINTEGRATING ORAL at 17:09

## 2024-10-21 RX ADMIN — DICYCLOMINE HYDROCHLORIDE 10 MG: 10 CAPSULE ORAL at 17:09
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
## 2024-10-21 NOTE — NURSING NOTE
Patient presents to intake and reports that [facility].     He reports that he wants to detox off cocaine and fentanyl.he snorts about a gm a day. And has been doing so for years. [place] two [place]. Got out and has been using again for a few months. Last use yesterday. COWS 14     He reports aching all over. Chills. Nausea and just feels bad.     Patient denies SI HI or AVH.    He denies using any other substances.

## 2024-10-21 NOTE — PLAN OF CARE
Goal Outcome Evaluation:  Plan of Care Reviewed With: patient  Patient Agreement with Plan of Care: agrees      New admission.  Care plan initiated.

## 2024-10-22 VITALS
TEMPERATURE: 98.2 F | DIASTOLIC BLOOD PRESSURE: 93 MMHG | OXYGEN SATURATION: 99 % | WEIGHT: 141.4 LBS | SYSTOLIC BLOOD PRESSURE: 144 MMHG | RESPIRATION RATE: 18 BRPM | BODY MASS INDEX: 19.8 KG/M2 | HEIGHT: 71 IN | HEART RATE: 84 BPM

## 2024-10-22 PROCEDURE — 99222 1ST HOSP IP/OBS MODERATE 55: CPT | Performed by: PSYCHIATRY & NEUROLOGY

## 2024-10-22 RX ADMIN — IBUPROFEN 400 MG: 400 TABLET, FILM COATED ORAL at 08:55

## 2024-10-22 RX ADMIN — CYCLOBENZAPRINE 10 MG: 10 TABLET, FILM COATED ORAL at 08:55

## 2024-10-22 RX ADMIN — DICYCLOMINE HYDROCHLORIDE 10 MG: 10 CAPSULE ORAL at 08:55

## 2024-10-22 RX ADMIN — HYDROXYZINE HYDROCHLORIDE 50 MG: 50 TABLET, FILM COATED ORAL at 08:55

## 2024-10-22 RX ADMIN — CLONIDINE HYDROCHLORIDE 0.1 MG: 0.1 TABLET ORAL at 08:54

## 2024-10-22 NOTE — CASE MANAGEMENT/SOCIAL WORK
Patient Name:  Harpal Poe  YOB: 1995  MRN: [id number]  Admit Date:  10/21/2024    Patient is being discharged back to [facility] on this date, agency to provide transportation. Patient denies problems with mood or withdrawal symptoms, denies SI/HI/AVH. Healthy coping skills and relapse prevention have been reviewed. Patient has been assisted with identifying risk factors which would indicate the need for higher level of care including thoughts to harm self or others and/or self-harming behavior. Encouraged patient to notify [facility] staff, call [phone] or present to the nearest emergency room should any of these events occur. No other needs identified.     Electronically signed by:  ARAM Mahan  10/22/24 11:50 EDT

## 2024-10-22 NOTE — DISCHARGE SUMMARY
":  1995  MRN:  [id number]  Visit Number:  [id number]      Date of Admission:10/21/2024   Date of Discharge:  10/22/2024    Discharge Diagnosis:  Active Problems:    Opioid use disorder, severe, dependence    Cocaine use disorder, severe, dependence        Admission Diagnosis:  Opiate addiction [F11.20]  Opiate addiction [F11.20]     BECK Poe is a 29 y.o. male who was admitted on 10/21/2024 with complaints of drug use and withdrawals.   For details please see H&P dated 10/22/24.    Hospital Course  Patient is a 29 y.o. male presented with opioid and cocaine use and withdrawals. The patient was admitted to the detox recovery unit on 10/21/24 and started on clonidine detox and comfort meds. The patient was seen the next day and he reported feeling better and was no longer experiencing any active withdrawals and his last use was about 3 days prior. He felt ready to move on to the next level of care at residential rehab and was discharged.       Mental Status Exam upon discharge:   Mood \"good\"   Affect-congruent, appropriate, stable  Thought Content-goal directed, no delusional material present  Thought process-linear, organized.  Suicidality: No SI  Homicidality: No HI  Perception: No AH/VH    Procedures Performed         Consults:   Consults       No orders found for last 30 day(s).            Pertinent Test Results:   Admission on 10/21/2024   Component Date Value Ref Range Status    Hepatitis B Surface Ag 10/21/2024 Non-Reactive  Non-Reactive Final    Hep A IgM 10/21/2024 Non-Reactive  Non-Reactive Final    Hep B C IgM 10/21/2024 Non-Reactive  Non-Reactive Final    Hepatitis C Ab 10/21/2024 Non-Reactive  Non-Reactive Final    QT Interval 10/21/2024 362  ms Final    QTC Interval 10/21/2024 398  ms Final    HIV-1/ HIV-2 10/21/2024 Non-Reactive  Non-Reactive Final    A non-reactive test result does not preclude the possibility of exposure to HIV or infection with HIV. An antibody response to recent " exposure may take several months to reach detectable levels.   Admission on 10/21/2024, Discharged on 10/21/2024   Component Date Value Ref Range Status    Glucose 10/21/2024 104 (H)  65 - 99 mg/dL Final    BUN 10/21/2024 9  6 - 20 mg/dL Final    Creatinine 10/21/2024 0.88  0.76 - 1.27 mg/dL Final    Sodium 10/21/2024 137  136 - 145 mmol/L Final    Potassium 10/21/2024 3.8  3.5 - 5.2 mmol/L Final    Chloride 10/21/2024 103  98 - 107 mmol/L Final    CO2 10/21/2024 29.0  22.0 - 29.0 mmol/L Final    Calcium 10/21/2024 9.8  8.6 - 10.5 mg/dL Final    Total Protein 10/21/2024 7.3  6.0 - 8.5 g/dL Final    Albumin 10/21/2024 4.3  3.5 - 5.2 g/dL Final    ALT (SGPT) 10/21/2024 18  1 - 41 U/L Final    AST (SGOT) 10/21/2024 33  1 - 40 U/L Final    Alkaline Phosphatase 10/21/2024 67  39 - 117 U/L Final    Total Bilirubin 10/21/2024 0.5  0.0 - 1.2 mg/dL Final    Globulin 10/21/2024 3.0  gm/dL Final    A/G Ratio 10/21/2024 1.4  g/dL Final    BUN/Creatinine Ratio 10/21/2024 10.2  7.0 - 25.0 Final    Anion Gap 10/21/2024 5.0  5.0 - 15.0 mmol/L Final    eGFR 10/21/2024 118.6  >60.0 mL/min/1.73 Final    Color, UA 10/21/2024 Yellow  Yellow, Straw Final    Appearance, UA 10/21/2024 Clear  Clear Final    pH, UA 10/21/2024 >=9.0 (H)  5.0 - 8.0 Final    Specific Gravity, UA 10/21/2024 1.018  1.005 - 1.030 Final    Glucose, UA 10/21/2024 Negative  Negative Final    Ketones, UA 10/21/2024 Negative  Negative Final    Bilirubin, UA 10/21/2024 Negative  Negative Final    Blood, UA 10/21/2024 Negative  Negative Final    Protein, UA 10/21/2024 Trace (A)  Negative Final    Leuk Esterase, UA 10/21/2024 Negative  Negative Final    Nitrite, UA 10/21/2024 Negative  Negative Final    Urobilinogen, UA 10/21/2024 1.0 E.U./dL  0.2 - 1.0 E.U./dL Final    Ethanol 10/21/2024 <10  0 - 10 mg/dL Final    Ethanol % 10/21/2024 <0.010  % Final    THC, Screen, Urine 10/21/2024 Positive (A)  Negative Final    Phencyclidine (PCP), Urine 10/21/2024 Negative  Negative  Final    Cocaine Screen, Urine 10/21/2024 Positive (A)  Negative Final    Methamphetamine, Ur 10/21/2024 Negative  Negative Final    Opiate Screen 10/21/2024 Negative  Negative Final    Amphetamine Screen, Urine 10/21/2024 Negative  Negative Final    Benzodiazepine Screen, Urine 10/21/2024 Negative  Negative Final    Tricyclic Antidepressants Screen 10/21/2024 Negative  Negative Final    Methadone Screen, Urine 10/21/2024 Negative  Negative Final    Barbiturates Screen, Urine 10/21/2024 Negative  Negative Final    Oxycodone Screen, Urine 10/21/2024 Negative  Negative Final    Buprenorphine, Screen, Urine 10/21/2024 Negative  Negative Final    Magnesium 10/21/2024 1.9  1.6 - 2.6 mg/dL Final    WBC 10/21/2024 6.64  3.40 - 10.80 10*3/mm3 Final    RBC 10/21/2024 4.88  4.14 - 5.80 10*6/mm3 Final    Hemoglobin 10/21/2024 13.3  13.0 - 17.7 g/dL Final    Hematocrit 10/21/2024 42.3  37.5 - 51.0 % Final    MCV 10/21/2024 86.7  79.0 - 97.0 fL Final    MCH 10/21/2024 27.3  26.6 - 33.0 pg Final    MCHC 10/21/2024 31.4 (L)  31.5 - 35.7 g/dL Final    RDW 10/21/2024 14.0  12.3 - 15.4 % Final    RDW-SD 10/21/2024 44.5  37.0 - 54.0 fl Final    MPV 10/21/2024 8.9  6.0 - 12.0 fL Final    Platelets 10/21/2024 247  140 - 450 10*3/mm3 Final    Neutrophil % 10/21/2024 69.6  42.7 - 76.0 % Final    Lymphocyte % 10/21/2024 23.8  19.6 - 45.3 % Final    Monocyte % 10/21/2024 4.2 (L)  5.0 - 12.0 % Final    Eosinophil % 10/21/2024 2.0  0.3 - 6.2 % Final    Basophil % 10/21/2024 0.2  0.0 - 1.5 % Final    Immature Grans % 10/21/2024 0.2  0.0 - 0.5 % Final    Neutrophils, Absolute 10/21/2024 4.63  1.70 - 7.00 10*3/mm3 Final    Lymphocytes, Absolute 10/21/2024 1.58  0.70 - 3.10 10*3/mm3 Final    Monocytes, Absolute 10/21/2024 0.28  0.10 - 0.90 10*3/mm3 Final    Eosinophils, Absolute 10/21/2024 0.13  0.00 - 0.40 10*3/mm3 Final    Basophils, Absolute 10/21/2024 0.01  0.00 - 0.20 10*3/mm3 Final    Immature Grans, Absolute 10/21/2024 0.01  0.00 - 0.05  10*3/mm3 Final    nRBC 10/21/2024 0.0  0.0 - 0.2 /100 WBC Final    Extra Tube 10/21/2024 Hold for add-ons.   Final    Auto resulted.    Extra Tube 10/21/2024 hold for add-on   Final    Auto resulted    Extra Tube 10/21/2024 Hold for add-ons.   Final    Auto resulted.    Extra Tube 10/21/2024 Hold for add-ons.   Final    Auto resulted    Fentanyl, Urine 10/21/2024 Positive (A)  Negative Final        Condition on Discharge:  improved    Vital Signs  Temp:  [97.5 °F (36.4 °C)-98.9 °F (37.2 °C)] 98.4 °F (36.9 °C)  Heart Rate:  [56-96] 67  Resp:  [16-18] 16  BP: (118-150)/() 126/82      Discharge Disposition:  Rehab Facility or Unit (DC - External)    Discharge Medications:     Discharge Medications      Patient Not Prescribed Medications Upon Discharge         Discharge Diet: Regular     Activity at Discharge: As tolerated     Follow-up Appointments  [facility]      Time: I spent  > 30  minutes on this discharge activity which included: face-to-face encounter with the patient, reviewing the data in the system, coordination of the care with the nursing staff as well as consultants, documentation, and entering orders.        Clinician:   Keily Resendiz MD  10/22/24  11:42 EDT

## 2024-10-22 NOTE — PROGRESS NOTES
Behavioral Health Discharge Summary             Please fax within 24 hours of discharge to [facility] at: [phone]      Member Name: Harpal Poe Member ID:    Authorization Number: no auth # only a [id number] Phone: [phone]   Member Address: [address] [place] [id number]   Discharge Date: 10/22/2024 Level of Care at Discharge:    Facility: [facility] Staff Completing Form: Hamida PRESTON   If the member is being discharged directly to a residential or extended care program, please specify the type below.   __Private Child-Caring Facility (PCC) Residential/Group Home   __Private Child-Caring Facility (PCC) Therapeutic Foster Care   __Residential Treatment Facility (RTF)   __Psychiatric Residential Treatment Facility (PRTF I or II)   __Long-Term Acute Inpatient Hospital Services or Extended Care Unit (ECU)   __Other (please specify):    Brief discharge summary of treatment received (for follow up by the case management team): D/C clinical with list of medications and follow up appts given to patient upon discharge.     BRIEF SUMMARY OF RECOMMENDATIONS FOR ONGOING TREATMENT     Discharged to where: [facility]    Discharge diagnoses: F 11.20    Axis I:    Axis II:    Axis III:    Axis IV:    Axis V:    Does the member understand his/her DX?  Yes          Medication     Dose     Schedule Supply/  Quantity  Given at Discharge RX Provided  Yes/No  If Rx Provided, Quantity RX Prior Auth Required  Yes/No Prior Auth  Completed                                                                                             Does the member understand the reason for taking these medications? Yes                                                           FOLLOW-UP APPOINTMENTS   Please schedule within 7 days of discharge and provide appointment details for all referred services.    PCP/Other Providers Involved in Treatment:    Appointment Type: IP  Rehab   Provider Name: [facility]  Provider Phone: [phone] Appointment Date: 10/22/2024 Appointment Time: Return/admit     Assessment   (new to OP services)        Case Management    Is the member already enrolled in case management?  Yes/No  If yes, date the CM was notified:    If no, was the CM referral offered?  Yes/No  Accepted? Yes/No    Is the Release of Information in the chart? Yes/No:      Medication Management (for member discharged with psychiatric medications):      A&D Treatment (for member with substance abuse/   dependence in the past year):      Medical Condition (for member with a medical condition):    Other recommended treatment:    Do you have any concerns about the discharge plan?  No    If yes, explain:    Was the member involved in the discharge planning?  Yes    If no, explain:    Was a copy of the discharge plan provided to the member?  Yes    If no, explain:

## 2024-10-22 NOTE — PAYOR COMM NOTE
"Harpal Martinez (29 y.o. Male)       Date of Birth   1995    Social Security Number       Address   [address] [place] [address]    Home Phone   [phone]    MRN   [id number]       Confucianist   [facility]    Marital Status   Single                            Admission Date   10/21/24    Admission Type   Emergency    Admitting Provider   Keily Resendiz MD    Attending Provider       Department, Room/Bed   [facility] ADULT CD, 1041/1S       Discharge Date   10/22/2024    Discharge Disposition   Rehab Facility or Unit (DC - External)    Discharge Destination                                 Attending Provider: (none)   Allergies: No Known Allergies    Isolation: None   Infection: None   Code Status: CPR    Ht: 180.3 cm (71\")   Wt: 64.1 kg (141 lb 6.4 oz)    Admission Cmt: None   Principal Problem: None                  Active Insurance as of 10/21/2024       Primary Coverage       Payor Plan Insurance Group Employer/Plan Group    [employer] [place] [employer] [place]       Payor Plan Address Payor Plan Phone Number Payor Plan Fax Number Effective Dates    [address] [phone]  1/1/2024 - None Entered    [facility] [address]         Subscriber Name Subscriber Birth Date Member ID       HARPAL MARTINEZ 1995 [id number]               Secondary Coverage       Payor Plan Insurance Group Employer/Plan Group    [facility]        Payor Plan Address Payor Plan Phone Number Payor Plan Fax Number Effective Dates    [address] [phone]  5/28/2024 - None Entered    [facility] [id number]         Subscriber Name Subscriber Birth Date Member ID       HARPAL MARTINEZ 1995 [id number]                     Emergency Contacts        (Rel.) Home Phone Work Phone Mobile Phone    ZOILA SHERMAN (Mother) [phone] -- --    [facility] [phone] -- --                 Discharge Summary        Keily Resendiz MD at " "10/22/24 1140          :  1995  MRN:  [id number]  Visit Number:  [id number]      Date of Admission:10/21/2024   Date of Discharge:  10/22/2024    Discharge Diagnosis:  Active Problems:    Opioid use disorder, severe, dependence    Cocaine use disorder, severe, dependence        Admission Diagnosis:  Opiate addiction [F11.20]  Opiate addiction [F11.20]     BECK Poe is a 29 y.o. male who was admitted on 10/21/2024 with complaints of drug use and withdrawals.   For details please see H&P dated 10/22/24.    Hospital Course  Patient is a 29 y.o. male presented with opioid and cocaine use and withdrawals. The patient was admitted to the detox recovery unit on 10/21/24 and started on clonidine detox and comfort meds. The patient was seen the next day and he reported feeling better and was no longer experiencing any active withdrawals and his last use was about 3 days prior. He felt ready to move on to the next level of care at residential rehab and was discharged.       Mental Status Exam upon discharge:   Mood \"good\"   Affect-congruent, appropriate, stable  Thought Content-goal directed, no delusional material present  Thought process-linear, organized.  Suicidality: No SI  Homicidality: No HI  Perception: No AH/VH    Procedures Performed         Consults:   Consults       No orders found for last 30 day(s).            Pertinent Test Results:   Admission on 10/21/2024   Component Date Value Ref Range Status    Hepatitis B Surface Ag 10/21/2024 Non-Reactive  Non-Reactive Final    Hep A IgM 10/21/2024 Non-Reactive  Non-Reactive Final    Hep B C IgM 10/21/2024 Non-Reactive  Non-Reactive Final    Hepatitis C Ab 10/21/2024 Non-Reactive  Non-Reactive Final    QT Interval 10/21/2024 362  ms Final    QTC Interval 10/21/2024 398  ms Final    HIV-1/ HIV-2 10/21/2024 Non-Reactive  Non-Reactive Final    A non-reactive test result does not preclude the possibility of exposure to HIV or infection with HIV. An " antibody response to recent exposure may take several months to reach detectable levels.   Admission on 10/21/2024, Discharged on 10/21/2024   Component Date Value Ref Range Status    Glucose 10/21/2024 104 (H)  65 - 99 mg/dL Final    BUN 10/21/2024 9  6 - 20 mg/dL Final    Creatinine 10/21/2024 0.88  0.76 - 1.27 mg/dL Final    Sodium 10/21/2024 137  136 - 145 mmol/L Final    Potassium 10/21/2024 3.8  3.5 - 5.2 mmol/L Final    Chloride 10/21/2024 103  98 - 107 mmol/L Final    CO2 10/21/2024 29.0  22.0 - 29.0 mmol/L Final    Calcium 10/21/2024 9.8  8.6 - 10.5 mg/dL Final    Total Protein 10/21/2024 7.3  6.0 - 8.5 g/dL Final    Albumin 10/21/2024 4.3  3.5 - 5.2 g/dL Final    ALT (SGPT) 10/21/2024 18  1 - 41 U/L Final    AST (SGOT) 10/21/2024 33  1 - 40 U/L Final    Alkaline Phosphatase 10/21/2024 67  39 - 117 U/L Final    Total Bilirubin 10/21/2024 0.5  0.0 - 1.2 mg/dL Final    Globulin 10/21/2024 3.0  gm/dL Final    A/G Ratio 10/21/2024 1.4  g/dL Final    BUN/Creatinine Ratio 10/21/2024 10.2  7.0 - 25.0 Final    Anion Gap 10/21/2024 5.0  5.0 - 15.0 mmol/L Final    eGFR 10/21/2024 118.6  >60.0 mL/min/1.73 Final    Color, UA 10/21/2024 Yellow  Yellow, Straw Final    Appearance, UA 10/21/2024 Clear  Clear Final    pH, UA 10/21/2024 >=9.0 (H)  5.0 - 8.0 Final    Specific Gravity, UA 10/21/2024 1.018  1.005 - 1.030 Final    Glucose, UA 10/21/2024 Negative  Negative Final    Ketones, UA 10/21/2024 Negative  Negative Final    Bilirubin, UA 10/21/2024 Negative  Negative Final    Blood, UA 10/21/2024 Negative  Negative Final    Protein, UA 10/21/2024 Trace (A)  Negative Final    Leuk Esterase, UA 10/21/2024 Negative  Negative Final    Nitrite, UA 10/21/2024 Negative  Negative Final    Urobilinogen, UA 10/21/2024 1.0 E.U./dL  0.2 - 1.0 E.U./dL Final    Ethanol 10/21/2024 <10  0 - 10 mg/dL Final    Ethanol % 10/21/2024 <0.010  % Final    THC, Screen, Urine 10/21/2024 Positive (A)  Negative Final    Phencyclidine (PCP), Urine  10/21/2024 Negative  Negative Final    Cocaine Screen, Urine 10/21/2024 Positive (A)  Negative Final    Methamphetamine, Ur 10/21/2024 Negative  Negative Final    Opiate Screen 10/21/2024 Negative  Negative Final    Amphetamine Screen, Urine 10/21/2024 Negative  Negative Final    Benzodiazepine Screen, Urine 10/21/2024 Negative  Negative Final    Tricyclic Antidepressants Screen 10/21/2024 Negative  Negative Final    Methadone Screen, Urine 10/21/2024 Negative  Negative Final    Barbiturates Screen, Urine 10/21/2024 Negative  Negative Final    Oxycodone Screen, Urine 10/21/2024 Negative  Negative Final    Buprenorphine, Screen, Urine 10/21/2024 Negative  Negative Final    Magnesium 10/21/2024 1.9  1.6 - 2.6 mg/dL Final    WBC 10/21/2024 6.64  3.40 - 10.80 10*3/mm3 Final    RBC 10/21/2024 4.88  4.14 - 5.80 10*6/mm3 Final    Hemoglobin 10/21/2024 13.3  13.0 - 17.7 g/dL Final    Hematocrit 10/21/2024 42.3  37.5 - 51.0 % Final    MCV 10/21/2024 86.7  79.0 - 97.0 fL Final    MCH 10/21/2024 27.3  26.6 - 33.0 pg Final    MCHC 10/21/2024 31.4 (L)  31.5 - 35.7 g/dL Final    RDW 10/21/2024 14.0  12.3 - 15.4 % Final    RDW-SD 10/21/2024 44.5  37.0 - 54.0 fl Final    MPV 10/21/2024 8.9  6.0 - 12.0 fL Final    Platelets 10/21/2024 247  140 - 450 10*3/mm3 Final    Neutrophil % 10/21/2024 69.6  42.7 - 76.0 % Final    Lymphocyte % 10/21/2024 23.8  19.6 - 45.3 % Final    Monocyte % 10/21/2024 4.2 (L)  5.0 - 12.0 % Final    Eosinophil % 10/21/2024 2.0  0.3 - 6.2 % Final    Basophil % 10/21/2024 0.2  0.0 - 1.5 % Final    Immature Grans % 10/21/2024 0.2  0.0 - 0.5 % Final    Neutrophils, Absolute 10/21/2024 4.63  1.70 - 7.00 10*3/mm3 Final    Lymphocytes, Absolute 10/21/2024 1.58  0.70 - 3.10 10*3/mm3 Final    Monocytes, Absolute 10/21/2024 0.28  0.10 - 0.90 10*3/mm3 Final    Eosinophils, Absolute 10/21/2024 0.13  0.00 - 0.40 10*3/mm3 Final    Basophils, Absolute 10/21/2024 0.01  0.00 - 0.20 10*3/mm3 Final    Immature Grans, Absolute  10/21/2024 0.01  0.00 - 0.05 10*3/mm3 Final    nRBC 10/21/2024 0.0  0.0 - 0.2 /100 WBC Final    Extra Tube 10/21/2024 Hold for add-ons.   Final    Auto resulted.    Extra Tube 10/21/2024 hold for add-on   Final    Auto resulted    Extra Tube 10/21/2024 Hold for add-ons.   Final    Auto resulted.    Extra Tube 10/21/2024 Hold for add-ons.   Final    Auto resulted    Fentanyl, Urine 10/21/2024 Positive (A)  Negative Final        Condition on Discharge:  improved    Vital Signs  Temp:  [97.5 °F (36.4 °C)-98.9 °F (37.2 °C)] 98.4 °F (36.9 °C)  Heart Rate:  [56-96] 67  Resp:  [16-18] 16  BP: (118-150)/() 126/82      Discharge Disposition:  Rehab Facility or Unit (DC - External)    Discharge Medications:     Discharge Medications      Patient Not Prescribed Medications Upon Discharge         Discharge Diet: Regular     Activity at Discharge: As tolerated     Follow-up Appointments  [facility]  [place], [place]  [phone]    Return at discharge        Time: I spent  > 30  minutes on this discharge activity which included: face-to-face encounter with the patient, reviewing the data in the system, coordination of the care with the nursing staff as well as consultants, documentation, and entering orders.        Clinician:   Keily Resendiz MD  10/22/24  11:42 EDT    Electronically signed by Keily Resendiz MD at 10/22/24 6258

## 2024-10-22 NOTE — PLAN OF CARE
Goal Outcome Evaluation:  Plan of Care Reviewed With: patient  Plan of Care Reviewed With: patient  Patient Agreement with Plan of Care: agrees     Progress: improving  Outcome Evaluation: Pt rates anx 8/10 and dep 7/10.  Per pt craving level is 10/10.  Pt eating and sleeping well.  Per pt has been having some vomitting, hot/cold, runny nose, body aches, feeling of things crawling on himself.  Pt is somewhat irritable this shift. Requesting meds this shift. MD on call gave no new meds.

## 2024-10-22 NOTE — PLAN OF CARE
Goal Outcome Evaluation:  Plan of Care Reviewed With: patient  Plan of Care Reviewed With: patient  Patient Agreement with Plan of Care: agrees     Progress: improving        Patient is ready for discharge.

## 2024-10-22 NOTE — H&P
INITIAL PSYCHIATRIC HISTORY & PHYSICAL    Patient Identification:  Name:   Harpal Poe  Age:   29 y.o.  Sex:   male  :   1995  MRN:   [id number]  Visit Number:   [id number]  Primary Care Physician:   Provider, No Known    SUBJECTIVE    CC/Focus of Exam: detox    HPI: Harpal Poe is a 29 y.o. male who was admitted on 10/21/2024 with complaints of drug use and withdrawals. The patient reports a long history of substance use. First use was 19 years old. Over time the use increased and the patient  continued to use despite negative consequences including relationship problems, social and financial problems. The patient endorses symptoms of tolerance and withdrawals and ongoing cravings to use. Has tried to cut down and stop but has not been successful. Spends too much time and resources in pursuit of substance use. Longest period of sobriety is reported to be 7 months.  Currently using fentanyl, cocaine, marijuana  Last use 10-  Withdrawal symptoms body aches, chills, sweats  Patient states that he uses tobacco. Patient states that he has a history of seizures with withdrawal. Patient states his environment as the reason he relapsed. Patient states his baby mama as a stressor in his life. Patient denies any history of physical, mental, or sexual abuse. Patient rates his appetite as fair. Patient rates his sleep as fair. Patient denies any nightmares. Patient rates his anxiety on a scale of 1/10 with 10 being the most severe a 6. Patient rates his depression on a scale of 1/10 with 10 being the most severe a 6. Patient rates his cravings on a scale of 1/10 with 10 being the most severe a 8.  Patient's COWS was 14. Patient denies any suicidal ideation. Patient denies any homicidal ideation. Patient denies any hallucinations.   Patient was admitted to [facility] psychiatry for further safety and stabilization.    Available medical/psychiatric records reviewed and incorporated into the  current document.     PAST PSYCHIATRIC HX: Patient has had 1 prior admission on 5--5-. Patient denies any outpatient care.     SUBSTANCE USE HX: UDS was positive for fentanyl, cocaine, THC. S  HPI for current use.     SOCIAL HX:  Patient states he was born and raised in [facility].  Patient states he currently resides with his mother in [place].  Patient states he is single and has 1 daughter that lives with her paternal grandmother.  Patient states he is currently unemployed.  Patient states he has a high school diploma.  Patient denies any legal issues.     Past Medical History:   Diagnosis Date    Substance abuse        Past Surgical History:   Procedure Laterality Date    NO PAST SURGERIES         History reviewed. No pertinent family history.      No medications prior to admission.           ALLERGIES:  Patient has no known allergies.    Temp:  [97.5 °F (36.4 °C)-98.9 °F (37.2 °C)] 98.4 °F (36.9 °C)  Heart Rate:  [56-96] 67  Resp:  [16-18] 16  BP: (118-150)/() 126/82    REVIEW OF SYSTEMS:  Review of Systems   Constitutional: Negative.    HENT: Negative.     Eyes: Negative.    Respiratory: Negative.     Cardiovascular: Negative.    Gastrointestinal: Negative.    Endocrine: Negative.    Genitourinary: Negative.    Musculoskeletal: Negative.    Skin: Negative.    Allergic/Immunologic: Negative.    Neurological: Negative.    Hematological: Negative.    Psychiatric/Behavioral: Negative.        See HPI for psychiatric ROS  OBJECTIVE    PHYSICAL EXAM:  Physical Exam  Constitutional:  Appears well-developed and well-nourished.   HENT:   Head: Normocephalic and atraumatic.   Right Ear: External ear normal.   Left Ear: External ear normal.   Mouth/Throat: Oropharynx is clear and moist.   Eyes: Pupils are equal, round, and reactive to light. Conjunctivae and EOM are normal.   Neck: Normal range of motion. Neck supple.   Cardiovascular: Normal rate, regular rhythm and normal heart sounds.     Respiratory: Effort normal and breath sounds normal. No respiratory distress. No wheezes.   GI: Soft. Bowel sounds are normal.No distension. There is no tenderness.   Musculoskeletal: Normal range of motion. No edema or deformity.   Neurological:  Cranial Nerves: I. No anosmia. II: No visual disturbance. III, IV VI: EOMI, PERRLA. V: Corneal reflext intact, no abnormal sensations. VII: No facial palsy, or altered sensation. VIII: Hearing intact, balance intact. IX: Intact ah reflex. X: Normal phonation, swallowing. XI: Normal shrug and head movement. XII: Intact tongue movements  Coordination normal. No lateralizing signs.  Skin: Skin is warm and dry. No rash noted. No erythema.     MENTAL STATUS EXAM:               Hygiene:   fair  Cooperation:  Cooperative  Eye Contact:  Good  Psychomotor Behavior:  Appropriate  Affect:  Appropriate  Hopelessness: 5  Speech:  Normal  Linear  Thought Content:  Normal  Suicidal:  None  Homicidal:  None  Hallucinations:  None  Delusion:  None  Memory:  Intact  Orientation:  Person, Place, Time, and Situation  Reliability:  fair  Insight:  Fair  Judgement:  Poor  Impulse Control:  Poor      Imaging Results (Last 24 Hours)       ** No results found for the last 24 hours. **             ECG/EMG Results (most recent)       Procedure Component Value Units Date/Time    ECG 12 Lead Other; Baseline Cardiac Status [[id number]] Collected: 10/21/24 1619     Updated: 10/21/24 1949     QT Interval 362 ms      QTC Interval 398 ms     Narrative:      Test Reason : Other~  Blood Pressure :   */*   mmHG  Vent. Rate :  73 BPM     Atrial Rate :  73 BPM     P-R Int : 160 ms          QRS Dur :  86 ms      QT Int : 362 ms       P-R-T Axes :  81  90  61 degrees     QTc Int : 398 ms    Normal sinus rhythm  Rightward axis  Borderline ECG    Confirmed by Ambar Flores ([id number]) on 10/21/2024 7:49:16 PM    Referred By: DYLON           Confirmed By: Ambar Flores             Lab Results   Component Value Date     GLUCOSE 104 (H) 10/21/2024    BUN 9 10/21/2024    CREATININE 0.88 10/21/2024    BCR 10.2 10/21/2024    CO2 29.0 10/21/2024    CALCIUM 9.8 10/21/2024    ALBUMIN 4.3 10/21/2024    AST 33 10/21/2024    ALT 18 10/21/2024       Lab Results   Component Value Date    WBC 6.64 10/21/2024    HGB 13.3 10/21/2024    HCT 42.3 10/21/2024    MCV 86.7 10/21/2024     10/21/2024       Pain Management Panel  More data may exist         Latest Ref Rng & Units 10/21/2024 5/28/2024   Pain Management Panel   Amphetamine, Urine Qual Negative Negative  Negative    Barbiturates Screen, Urine Negative Negative  Negative    Benzodiazepine Screen, Urine Negative Negative  Negative    Buprenorphine, Screen, Urine Negative Negative  Negative    Cocaine Screen, Urine Negative Positive  Positive    Fentanyl, Urine Negative Positive  Positive    Methadone Screen , Urine Negative Negative  Negative    Methamphetamine, Ur Negative Negative  Negative       Details                   Brief Urine Lab Results  (Last result in the past 365 days)        Color   Clarity   Blood   Leuk Est   Nitrite   Protein   CREAT   Urine HCG        10/21/24 1102 Yellow   Clear   Negative   Negative   Negative   Trace                   Reviewed labs and studies done with this admission.       ASSESSMENT & PLAN:        Opioid use disorder, severe, dependence    Cocaine use disorder, severe, dependence      Hospital bed: No    The patient has been admitted for safety and stabilization.  Patient reports a lot of distress at the time of admission but is feeling better now and reports the worst of withdrawals are behind him. His last use was 3 days ago. He feels ready to go to rehab treatment and will be discharged today.       Written by Elba Amezcua acting as scribe for Dr.Mazhar Resendiz signature on this note affirms that the note adequately documents the care provided.   This note was generated using a scribe,   Elba Amezcua MA  10/22/24  8:50 AM EDT    I  Keily Resendiz MD, personally performed the services described in this documentation as scribed by the above named individual in my presence, and it is both accurate and complete.

## 2024-10-22 NOTE — NURSING NOTE
"Pt stating that he is having a feeling that \"bugs\" are crawling over his skin and high anxiety.  Dr Soriano gives no new orders at this time.   "

## 2025-06-23 ENCOUNTER — HOSPITAL ENCOUNTER (INPATIENT)
Facility: HOSPITAL | Age: 30
LOS: 4 days | Discharge: NURSING, REHAB OR LONG-TERM CARE | End: 2025-06-27
Attending: PSYCHIATRY & NEUROLOGY | Admitting: PSYCHIATRY & NEUROLOGY
Payer: COMMERCIAL

## 2025-06-23 ENCOUNTER — HOSPITAL ENCOUNTER (EMERGENCY)
Facility: HOSPITAL | Age: 30
Discharge: PSYCHIATRIC OR SUBSTANCE USE FACILITY | DRG: 897 | End: 2025-06-23
Payer: COMMERCIAL

## 2025-06-23 VITALS
DIASTOLIC BLOOD PRESSURE: 95 MMHG | TEMPERATURE: 98 F | SYSTOLIC BLOOD PRESSURE: 140 MMHG | RESPIRATION RATE: 18 BRPM | OXYGEN SATURATION: 99 % | HEART RATE: 88 BPM | BODY MASS INDEX: 20.3 KG/M2 | WEIGHT: 145 LBS | HEIGHT: 71 IN

## 2025-06-23 DIAGNOSIS — F14.10 COCAINE ABUSE: ICD-10-CM

## 2025-06-23 DIAGNOSIS — F11.10 MILD FENTANYL ABUSE: Primary | ICD-10-CM

## 2025-06-23 PROBLEM — F19.10 POLYSUBSTANCE ABUSE: Status: ACTIVE | Noted: 2025-06-23

## 2025-06-23 LAB
ALBUMIN SERPL-MCNC: 4.3 G/DL (ref 3.5–5.2)
ALBUMIN/GLOB SERPL: 1.6 G/DL
ALP SERPL-CCNC: 72 U/L (ref 39–117)
ALT SERPL W P-5'-P-CCNC: 8 U/L (ref 1–41)
AMPHET+METHAMPHET UR QL: NEGATIVE
AMPHETAMINES UR QL: NEGATIVE
ANION GAP SERPL CALCULATED.3IONS-SCNC: 9.1 MMOL/L (ref 5–15)
AST SERPL-CCNC: 16 U/L (ref 1–40)
BARBITURATES UR QL SCN: NEGATIVE
BASOPHILS # BLD AUTO: 0.01 10*3/MM3 (ref 0–0.2)
BASOPHILS NFR BLD AUTO: 0.2 % (ref 0–1.5)
BENZODIAZ UR QL SCN: NEGATIVE
BILIRUB SERPL-MCNC: 0.4 MG/DL (ref 0–1.2)
BILIRUB UR QL STRIP: NEGATIVE
BUN SERPL-MCNC: 6.5 MG/DL (ref 6–20)
BUN/CREAT SERPL: 8.7 (ref 7–25)
BUPRENORPHINE SERPL-MCNC: NEGATIVE NG/ML
CALCIUM SPEC-SCNC: 9.3 MG/DL (ref 8.6–10.5)
CANNABINOIDS SERPL QL: NEGATIVE
CHLORIDE SERPL-SCNC: 104 MMOL/L (ref 98–107)
CLARITY UR: CLEAR
CO2 SERPL-SCNC: 26.9 MMOL/L (ref 22–29)
COCAINE UR QL: POSITIVE
COLOR UR: YELLOW
CREAT SERPL-MCNC: 0.75 MG/DL (ref 0.76–1.27)
DEPRECATED RDW RBC AUTO: 44.4 FL (ref 37–54)
EGFRCR SERPLBLD CKD-EPI 2021: 123.7 ML/MIN/1.73
EOSINOPHIL # BLD AUTO: 0.15 10*3/MM3 (ref 0–0.4)
EOSINOPHIL NFR BLD AUTO: 2.5 % (ref 0.3–6.2)
ERYTHROCYTE [DISTWIDTH] IN BLOOD BY AUTOMATED COUNT: 14 % (ref 12.3–15.4)
ETHANOL BLD-MCNC: <10 MG/DL (ref 0–10)
ETHANOL UR QL: <0.01 %
FENTANYL UR-MCNC: POSITIVE NG/ML
GLOBULIN UR ELPH-MCNC: 2.7 GM/DL
GLUCOSE SERPL-MCNC: 99 MG/DL (ref 65–99)
GLUCOSE UR STRIP-MCNC: NEGATIVE MG/DL
HAV IGM SERPL QL IA: NORMAL
HBV CORE IGM SERPL QL IA: NORMAL
HBV SURFACE AG SERPL QL IA: NORMAL
HCT VFR BLD AUTO: 43.1 % (ref 37.5–51)
HCV AB SER QL: NORMAL
HGB BLD-MCNC: 13.6 G/DL (ref 13–17.7)
HGB UR QL STRIP.AUTO: NEGATIVE
IMM GRANULOCYTES # BLD AUTO: 0.02 10*3/MM3 (ref 0–0.05)
IMM GRANULOCYTES NFR BLD AUTO: 0.3 % (ref 0–0.5)
KETONES UR QL STRIP: NEGATIVE
LEUKOCYTE ESTERASE UR QL STRIP.AUTO: NEGATIVE
LYMPHOCYTES # BLD AUTO: 1.85 10*3/MM3 (ref 0.7–3.1)
LYMPHOCYTES NFR BLD AUTO: 31.1 % (ref 19.6–45.3)
MAGNESIUM SERPL-MCNC: 2 MG/DL (ref 1.6–2.6)
MCH RBC QN AUTO: 27.3 PG (ref 26.6–33)
MCHC RBC AUTO-ENTMCNC: 31.6 G/DL (ref 31.5–35.7)
MCV RBC AUTO: 86.5 FL (ref 79–97)
METHADONE UR QL SCN: NEGATIVE
MONOCYTES # BLD AUTO: 0.26 10*3/MM3 (ref 0.1–0.9)
MONOCYTES NFR BLD AUTO: 4.4 % (ref 5–12)
NEUTROPHILS NFR BLD AUTO: 3.66 10*3/MM3 (ref 1.7–7)
NEUTROPHILS NFR BLD AUTO: 61.5 % (ref 42.7–76)
NITRITE UR QL STRIP: NEGATIVE
NRBC BLD AUTO-RTO: 0 /100 WBC (ref 0–0.2)
OPIATES UR QL: NEGATIVE
OXYCODONE UR QL SCN: NEGATIVE
PCP UR QL SCN: NEGATIVE
PH UR STRIP.AUTO: >9 [PH] (ref 5–8)
PLATELET # BLD AUTO: 239 10*3/MM3 (ref 140–450)
PMV BLD AUTO: 9 FL (ref 6–12)
POTASSIUM SERPL-SCNC: 4.5 MMOL/L (ref 3.5–5.2)
PROT SERPL-MCNC: 7 G/DL (ref 6–8.5)
PROT UR QL STRIP: NEGATIVE
RBC # BLD AUTO: 4.98 10*6/MM3 (ref 4.14–5.8)
SODIUM SERPL-SCNC: 140 MMOL/L (ref 136–145)
SP GR UR STRIP: 1.02 (ref 1–1.03)
TRICYCLICS UR QL SCN: NEGATIVE
UROBILINOGEN UR QL STRIP: ABNORMAL
WBC NRBC COR # BLD AUTO: 5.95 10*3/MM3 (ref 3.4–10.8)

## 2025-06-23 PROCEDURE — 85025 COMPLETE CBC W/AUTO DIFF WBC: CPT

## 2025-06-23 PROCEDURE — 80074 ACUTE HEPATITIS PANEL: CPT | Performed by: PSYCHIATRY & NEUROLOGY

## 2025-06-23 PROCEDURE — 83735 ASSAY OF MAGNESIUM: CPT

## 2025-06-23 PROCEDURE — 82077 ASSAY SPEC XCP UR&BREATH IA: CPT

## 2025-06-23 PROCEDURE — 80053 COMPREHEN METABOLIC PANEL: CPT

## 2025-06-23 PROCEDURE — 99285 EMERGENCY DEPT VISIT HI MDM: CPT

## 2025-06-23 PROCEDURE — 93005 ELECTROCARDIOGRAM TRACING: CPT

## 2025-06-23 PROCEDURE — 93010 ELECTROCARDIOGRAM REPORT: CPT | Performed by: INTERNAL MEDICINE

## 2025-06-23 PROCEDURE — 81003 URINALYSIS AUTO W/O SCOPE: CPT

## 2025-06-23 PROCEDURE — 36415 COLL VENOUS BLD VENIPUNCTURE: CPT

## 2025-06-23 PROCEDURE — 80307 DRUG TEST PRSMV CHEM ANLYZR: CPT

## 2025-06-23 RX ORDER — CYCLOBENZAPRINE HCL 10 MG
10 TABLET ORAL 3 TIMES DAILY PRN
Status: DISCONTINUED | OUTPATIENT
Start: 2025-06-23 | End: 2025-06-27 | Stop reason: HOSPADM

## 2025-06-23 RX ORDER — ECHINACEA PURPUREA EXTRACT 125 MG
2 TABLET ORAL
Status: DISCONTINUED | OUTPATIENT
Start: 2025-06-23 | End: 2025-06-27 | Stop reason: HOSPADM

## 2025-06-23 RX ORDER — POLYETHYLENE GLYCOL 3350 17 G/17G
17 POWDER, FOR SOLUTION ORAL DAILY PRN
Status: DISCONTINUED | OUTPATIENT
Start: 2025-06-23 | End: 2025-06-27 | Stop reason: HOSPADM

## 2025-06-23 RX ORDER — BENZTROPINE MESYLATE 1 MG/1
2 TABLET ORAL ONCE AS NEEDED
Status: DISCONTINUED | OUTPATIENT
Start: 2025-06-23 | End: 2025-06-27 | Stop reason: HOSPADM

## 2025-06-23 RX ORDER — HYDRALAZINE HYDROCHLORIDE 25 MG/1
25 TABLET, FILM COATED ORAL DAILY PRN
Status: DISCONTINUED | OUTPATIENT
Start: 2025-06-23 | End: 2025-06-27 | Stop reason: HOSPADM

## 2025-06-23 RX ORDER — FAMOTIDINE 20 MG/1
20 TABLET, FILM COATED ORAL 2 TIMES DAILY PRN
Status: DISCONTINUED | OUTPATIENT
Start: 2025-06-23 | End: 2025-06-27 | Stop reason: HOSPADM

## 2025-06-23 RX ORDER — CLONIDINE HYDROCHLORIDE 0.1 MG/1
0.1 TABLET ORAL 2 TIMES DAILY PRN
Status: ACTIVE | OUTPATIENT
Start: 2025-06-26 | End: 2025-06-27

## 2025-06-23 RX ORDER — CLONIDINE HYDROCHLORIDE 0.1 MG/1
0.1 TABLET ORAL ONCE AS NEEDED
Status: DISCONTINUED | OUTPATIENT
Start: 2025-06-27 | End: 2025-06-27 | Stop reason: HOSPADM

## 2025-06-23 RX ORDER — TRAZODONE HYDROCHLORIDE 50 MG/1
50 TABLET ORAL NIGHTLY PRN
Status: DISCONTINUED | OUTPATIENT
Start: 2025-06-23 | End: 2025-06-27 | Stop reason: HOSPADM

## 2025-06-23 RX ORDER — ALUMINA, MAGNESIA, AND SIMETHICONE 2400; 2400; 240 MG/30ML; MG/30ML; MG/30ML
15 SUSPENSION ORAL EVERY 6 HOURS PRN
Status: DISCONTINUED | OUTPATIENT
Start: 2025-06-23 | End: 2025-06-27 | Stop reason: HOSPADM

## 2025-06-23 RX ORDER — BENZTROPINE MESYLATE 1 MG/ML
1 INJECTION, SOLUTION INTRAMUSCULAR; INTRAVENOUS ONCE AS NEEDED
Status: DISCONTINUED | OUTPATIENT
Start: 2025-06-23 | End: 2025-06-27 | Stop reason: HOSPADM

## 2025-06-23 RX ORDER — IBUPROFEN 400 MG/1
400 TABLET, FILM COATED ORAL EVERY 6 HOURS PRN
Status: DISCONTINUED | OUTPATIENT
Start: 2025-06-23 | End: 2025-06-27 | Stop reason: HOSPADM

## 2025-06-23 RX ORDER — DICYCLOMINE HYDROCHLORIDE 10 MG/1
10 CAPSULE ORAL 3 TIMES DAILY PRN
Status: DISCONTINUED | OUTPATIENT
Start: 2025-06-23 | End: 2025-06-27 | Stop reason: HOSPADM

## 2025-06-23 RX ORDER — BENZONATATE 100 MG/1
100 CAPSULE ORAL 3 TIMES DAILY PRN
Status: DISCONTINUED | OUTPATIENT
Start: 2025-06-23 | End: 2025-06-27 | Stop reason: HOSPADM

## 2025-06-23 RX ORDER — HYDROXYZINE HYDROCHLORIDE 50 MG/1
50 TABLET, FILM COATED ORAL EVERY 6 HOURS PRN
Status: DISCONTINUED | OUTPATIENT
Start: 2025-06-23 | End: 2025-06-27 | Stop reason: HOSPADM

## 2025-06-23 RX ORDER — CLONIDINE HYDROCHLORIDE 0.1 MG/1
0.1 TABLET ORAL 3 TIMES DAILY PRN
Status: DISPENSED | OUTPATIENT
Start: 2025-06-25 | End: 2025-06-26

## 2025-06-23 RX ORDER — ONDANSETRON 4 MG/1
4 TABLET, ORALLY DISINTEGRATING ORAL EVERY 6 HOURS PRN
Status: DISCONTINUED | OUTPATIENT
Start: 2025-06-23 | End: 2025-06-27 | Stop reason: HOSPADM

## 2025-06-23 RX ORDER — CLONIDINE HYDROCHLORIDE 0.1 MG/1
0.1 TABLET ORAL 4 TIMES DAILY PRN
Status: DISPENSED | OUTPATIENT
Start: 2025-06-23 | End: 2025-06-24

## 2025-06-23 RX ORDER — ACETAMINOPHEN 325 MG/1
650 TABLET ORAL EVERY 6 HOURS PRN
Status: DISCONTINUED | OUTPATIENT
Start: 2025-06-23 | End: 2025-06-27 | Stop reason: HOSPADM

## 2025-06-23 RX ORDER — CLONIDINE HYDROCHLORIDE 0.1 MG/1
0.1 TABLET ORAL 4 TIMES DAILY PRN
Status: DISPENSED | OUTPATIENT
Start: 2025-06-24 | End: 2025-06-25

## 2025-06-23 RX ORDER — LOPERAMIDE HYDROCHLORIDE 2 MG/1
2 CAPSULE ORAL
Status: DISCONTINUED | OUTPATIENT
Start: 2025-06-23 | End: 2025-06-27 | Stop reason: HOSPADM

## 2025-06-23 RX ADMIN — HYDROXYZINE HYDROCHLORIDE 50 MG: 50 TABLET, FILM COATED ORAL at 13:18

## 2025-06-23 RX ADMIN — DICYCLOMINE HYDROCHLORIDE 10 MG: 10 CAPSULE ORAL at 13:18

## 2025-06-23 RX ADMIN — CYCLOBENZAPRINE 10 MG: 10 TABLET, FILM COATED ORAL at 13:18

## 2025-06-23 RX ADMIN — CLONIDINE HYDROCHLORIDE 0.1 MG: 0.1 TABLET ORAL at 13:18

## 2025-06-23 RX ADMIN — IBUPROFEN 400 MG: 400 TABLET, FILM COATED ORAL at 13:18

## 2025-06-23 NOTE — ED PROVIDER NOTES
Subjective   History of Present Illness  This is a 30 year old male patient who presents to the ER with chief complaint of need to detox. Patient uses cocaine and fentanyl by snorting them. He denies IV drug abuse. He denies alcohol abuse, HI and SI. He comes from [facility].       Review of Systems   Constitutional: Negative.  Negative for fever.   HENT: Negative.     Respiratory: Negative.     Cardiovascular: Negative.  Negative for chest pain.   Gastrointestinal: Negative.  Negative for abdominal pain.   Endocrine: Negative.    Genitourinary: Negative.  Negative for dysuria.   Skin: Negative.    Neurological: Negative.    Psychiatric/Behavioral: Negative.     All other systems reviewed and are negative.      Past Medical History:   Diagnosis Date    Substance abuse        No Known Allergies    Past Surgical History:   Procedure Laterality Date    NO PAST SURGERIES         History reviewed. No pertinent family history.    Social History     Socioeconomic History    Marital status: Single     Spouse name: denies    Number of children: 2    Highest education level: Some college, no degree   Tobacco Use    Smoking status: Every Day     Current packs/day: 0.25     Average packs/day: 0.3 packs/day for 6.0 years (1.5 ttl pk-yrs)     Types: Cigarettes     Start date: 6/23/2019     Passive exposure: Current    Smokeless tobacco: Never   Vaping Use    Vaping status: Every Day    Substances: Nicotine, Flavoring    Devices: Disposable, Pre-filled or refillable cartridge, Pre-filled pod   Substance and Sexual Activity    Alcohol use: Not Currently     Comment: denies    Drug use: Yes     Types: Fentanyl, Cocaine(coke)    Sexual activity: Defer           Objective   Physical Exam  Vitals and nursing note reviewed.   Constitutional:       General: He is not in acute distress.     Appearance: He is well-developed. He is not diaphoretic.   HENT:      Head: Normocephalic and atraumatic.      Right Ear: External ear normal.       Left Ear: External ear normal.      Nose: Nose normal.   Eyes:      Conjunctiva/sclera: Conjunctivae normal.      Pupils: Pupils are equal, round, and reactive to light.   Neck:      Vascular: No JVD.      Trachea: No tracheal deviation.   Cardiovascular:      Rate and Rhythm: Normal rate and regular rhythm.      Heart sounds: Normal heart sounds. No murmur heard.  Pulmonary:      Effort: Pulmonary effort is normal. No respiratory distress.      Breath sounds: Normal breath sounds. No wheezing.   Abdominal:      General: Bowel sounds are normal.      Palpations: Abdomen is soft.      Tenderness: There is no abdominal tenderness.   Musculoskeletal:         General: No deformity. Normal range of motion.      Cervical back: Normal range of motion and neck supple.   Skin:     General: Skin is warm and dry.      Coloration: Skin is not pale.      Findings: No erythema or rash.   Neurological:      Mental Status: He is alert and oriented to person, place, and time.      Cranial Nerves: No cranial nerve deficit.   Psychiatric:         Behavior: Behavior normal.         Thought Content: Thought content normal.         Procedures       Results for orders placed or performed during the hospital encounter of 06/23/25   Urinalysis With Microscopic If Indicated (No Culture) - Urine, Clean Catch    Collection Time: 06/23/25 11:50 AM    Specimen: Urine, Clean Catch   Result Value Ref Range    Color, UA Yellow Yellow, Straw    Appearance, UA Clear Clear    pH, UA >9.0 (H) 5.0 - 8.0    Specific Gravity, UA 1.018 1.005 - 1.030    Glucose, UA Negative Negative    Ketones, UA Negative Negative    Bilirubin, UA Negative Negative    Blood, UA Negative Negative    Protein, UA Negative Negative    Leuk Esterase, UA Negative Negative    Nitrite, UA Negative Negative    Urobilinogen, UA 1.0 E.U./dL 0.2 - 1.0 E.U./dL   Urine Drug Screen - Urine, Clean Catch    Collection Time: 06/23/25 11:50 AM    Specimen: Urine, Clean Catch   Result Value  Ref Range    THC, Screen, Urine Negative Negative    Phencyclidine (PCP), Urine Negative Negative    Cocaine Screen, Urine Positive (A) Negative    Methamphetamine, Ur Negative Negative    Opiate Screen Negative Negative    Amphetamine Screen, Urine Negative Negative    Benzodiazepine Screen, Urine Negative Negative    Tricyclic Antidepressants Screen Negative Negative    Methadone Screen, Urine Negative Negative    Barbiturates Screen, Urine Negative Negative    Oxycodone Screen, Urine Negative Negative    Buprenorphine, Screen, Urine Negative Negative   Fentanyl, Urine - Urine, Clean Catch    Collection Time: 06/23/25 11:50 AM    Specimen: Urine, Clean Catch   Result Value Ref Range    Fentanyl, Urine Positive (A) Negative   Comprehensive Metabolic Panel    Collection Time: 06/23/25 11:52 AM    Specimen: Blood   Result Value Ref Range    Glucose 99 65 - 99 mg/dL    BUN 6.5 6.0 - 20.0 mg/dL    Creatinine 0.75 (L) 0.76 - 1.27 mg/dL    Sodium 140 136 - 145 mmol/L    Potassium 4.5 3.5 - 5.2 mmol/L    Chloride 104 98 - 107 mmol/L    CO2 26.9 22.0 - 29.0 mmol/L    Calcium 9.3 8.6 - 10.5 mg/dL    Total Protein 7.0 6.0 - 8.5 g/dL    Albumin 4.3 3.5 - 5.2 g/dL    ALT (SGPT) 8 1 - 41 U/L    AST (SGOT) 16 1 - 40 U/L    Alkaline Phosphatase 72 39 - 117 U/L    Total Bilirubin 0.4 0.0 - 1.2 mg/dL    Globulin 2.7 gm/dL    A/G Ratio 1.6 g/dL    BUN/Creatinine Ratio 8.7 7.0 - 25.0    Anion Gap 9.1 5.0 - 15.0 mmol/L    eGFR 123.7 >60.0 mL/min/1.73   Magnesium    Collection Time: 06/23/25 11:52 AM    Specimen: Blood   Result Value Ref Range    Magnesium 2.0 1.6 - 2.6 mg/dL   Ethanol    Collection Time: 06/23/25 11:52 AM    Specimen: Blood   Result Value Ref Range    Ethanol <10 0 - 10 mg/dL    Ethanol % <0.010 %   CBC Auto Differential    Collection Time: 06/23/25 11:52 AM    Specimen: Blood   Result Value Ref Range    WBC 5.95 3.40 - 10.80 10*3/mm3    RBC 4.98 4.14 - 5.80 10*6/mm3    Hemoglobin 13.6 13.0 - 17.7 g/dL    Hematocrit  43.1 37.5 - 51.0 %    MCV 86.5 79.0 - 97.0 fL    MCH 27.3 26.6 - 33.0 pg    MCHC 31.6 31.5 - 35.7 g/dL    RDW 14.0 12.3 - 15.4 %    RDW-SD 44.4 37.0 - 54.0 fl    MPV 9.0 6.0 - 12.0 fL    Platelets 239 140 - 450 10*3/mm3    Neutrophil % 61.5 42.7 - 76.0 %    Lymphocyte % 31.1 19.6 - 45.3 %    Monocyte % 4.4 (L) 5.0 - 12.0 %    Eosinophil % 2.5 0.3 - 6.2 %    Basophil % 0.2 0.0 - 1.5 %    Immature Grans % 0.3 0.0 - 0.5 %    Neutrophils, Absolute 3.66 1.70 - 7.00 10*3/mm3    Lymphocytes, Absolute 1.85 0.70 - 3.10 10*3/mm3    Monocytes, Absolute 0.26 0.10 - 0.90 10*3/mm3    Eosinophils, Absolute 0.15 0.00 - 0.40 10*3/mm3    Basophils, Absolute 0.01 0.00 - 0.20 10*3/mm3    Immature Grans, Absolute 0.02 0.00 - 0.05 10*3/mm3    nRBC 0.0 0.0 - 0.2 /100 WBC   ECG 12 Lead Other; detox    Collection Time: 06/23/25 12:40 PM   Result Value Ref Range    QT Interval 356 ms    QTC Interval 378 ms        ED Course  ED Course as of 06/23/25 1244   Mon Jun 23, 2025   1221 Patient is medically cleared for psych.  [MM]   1241 EKG interpretation normal sinus rhythm 68 bpm QTc is 378 no ST elevations or depressions.  Electronically signed by Miguel Justin DO, 06/23/25, 12:41 PM EDT.   [GF]   1243 Patient is being admitted to [facility]. [MM]      ED Course User Index  [GF] Miguel Justin DO  [MM] Mandy Keating PA                                                       Medical Decision Making    This is a 30 year old male patient who presents to the ER with chief complaint of need to detox. Patient uses cocaine and fentanyl by snorting them. He denies IV drug abuse. He denies alcohol abuse, HI and SI. He comes from [facility].       Problems Addressed:  Cocaine abuse: complicated acute illness or injury  Mild fentanyl abuse: complicated acute illness or injury    Amount and/or Complexity of Data Reviewed  Labs: ordered. Decision-making details documented in ED Course.  ECG/medicine tests: ordered. Decision-making  details documented in ED Course.        Final diagnoses:   Mild fentanyl abuse   Cocaine abuse       ED Disposition  ED Disposition       ED Disposition   DC/Transfer to Behavioral Health    Condition   Stable    Comment   --               No follow-up provider specified.       Medication List      No changes were made to your prescriptions during this visit.            Mandy Keating PA  06/23/25 1086

## 2025-06-23 NOTE — NURSING NOTE
Presented to ED requesting detox.  Reports that he has been snorting 0.5 gram of cocaine every other day and 2-3 grams of fentanyl daily.  Had been clean for 7 months and relapsed in February 2025.  Has 2 prior admissions here in 2024.  Denies SI/HI.  Plans to continue treatment at [facility] upon discharge.  Report given to ENRIQUE Medina.

## 2025-06-23 NOTE — NURSING NOTE
Pt assessment complete     Pt brought from [facility] and is seeking medical detox.     Pt reports that he has been using cocaine 0.5 gram every other day intranasally. Last use 6/21/25    Fentanyl- 2-3 grams daily intranasally last use 6/22/25   Pt reports he relapsed 3 months ago    Pt denies si/hi/avh  Depression 10   Anxiety 8  Poor sleep/appetite   Cows 13

## 2025-06-24 PROCEDURE — HZ2ZZZZ DETOXIFICATION SERVICES FOR SUBSTANCE ABUSE TREATMENT: ICD-10-PCS | Performed by: PSYCHIATRY & NEUROLOGY

## 2025-06-24 PROCEDURE — 99222 1ST HOSP IP/OBS MODERATE 55: CPT | Performed by: PSYCHIATRY & NEUROLOGY

## 2025-06-24 PROCEDURE — 63710000001 ONDANSETRON ODT 4 MG TABLET DISPERSIBLE: Performed by: PSYCHIATRY & NEUROLOGY

## 2025-06-24 RX ADMIN — IBUPROFEN 400 MG: 400 TABLET, FILM COATED ORAL at 11:22

## 2025-06-24 RX ADMIN — DICYCLOMINE HYDROCHLORIDE 10 MG: 10 CAPSULE ORAL at 11:22

## 2025-06-24 RX ADMIN — ACETAMINOPHEN 650 MG: 325 TABLET ORAL at 21:08

## 2025-06-24 RX ADMIN — ONDANSETRON 4 MG: 4 TABLET, ORALLY DISINTEGRATING ORAL at 11:22

## 2025-06-24 RX ADMIN — IBUPROFEN 400 MG: 400 TABLET, FILM COATED ORAL at 19:04

## 2025-06-24 RX ADMIN — CYCLOBENZAPRINE 10 MG: 10 TABLET, FILM COATED ORAL at 21:08

## 2025-06-24 RX ADMIN — CLONIDINE HYDROCHLORIDE 0.1 MG: 0.1 TABLET ORAL at 19:04

## 2025-06-24 RX ADMIN — IBUPROFEN 400 MG: 400 TABLET, FILM COATED ORAL at 04:36

## 2025-06-24 RX ADMIN — HYDROXYZINE HYDROCHLORIDE 50 MG: 50 TABLET, FILM COATED ORAL at 19:04

## 2025-06-24 RX ADMIN — CYCLOBENZAPRINE 10 MG: 10 TABLET, FILM COATED ORAL at 04:36

## 2025-06-24 RX ADMIN — TRAZODONE HYDROCHLORIDE 50 MG: 50 TABLET ORAL at 21:08

## 2025-06-24 RX ADMIN — HYDROXYZINE HYDROCHLORIDE 50 MG: 50 TABLET, FILM COATED ORAL at 11:22

## 2025-06-24 RX ADMIN — CLONIDINE HYDROCHLORIDE 0.1 MG: 0.1 TABLET ORAL at 11:22

## 2025-06-24 NOTE — H&P
INITIAL PSYCHIATRIC HISTORY & PHYSICAL    Patient Identification:  Name:  Harpal Poe  Age:  30 y.o.  Sex:  male  :  1995  MRN:  [id number]   Visit Number:  [id number]  Primary Care Physician:  Provider, No Known    SUBJECTIVE    CC/Focus of Exam: Opioid and cocaine use    HPI: Harpal Poe is a 30 y.o. male who was admitted on 2025 with complaints of opioid and cocaine use and withdrawals. The patient reports a long history of substance use. First use was at age 17 when he started using marijuana and he started using pain pills and cocaine at age 21. Over time the use increased and the patient  continued to use despite negative consequences including relationship problems, social and financial problems. The patient endorses symptoms of tolerance and withdrawals and ongoing cravings to use. Has tried to cut down and stop but has not been successful. Spends too much time and resources in pursuit of substance use. Longest period of sobriety is reported to be 8 months.  Currently using a gram of fentanyl and a gram of cocaine via snorting.   Last use was about two days ago.   Withdrawal symptoms tremors, shaking, congestion, cannot eat, anxiety.     PAST PSYCHIATRIC HX: None reported.     SUBSTANCE USE HX: See HPI.     SOCIAL HX:   Social History     Socioeconomic History    Marital status: Single    Number of children: 1    Highest education level: Some college, no degree   Tobacco Use    Smoking status: Every Day     Current packs/day: 0.25     Average packs/day: 0.3 packs/day for 6.0 years (1.5 ttl pk-yrs)     Types: Cigarettes     Start date: 2019     Passive exposure: Current    Smokeless tobacco: Never   Vaping Use    Vaping status: Every Day    Substances: Nicotine, Flavoring    Devices: Disposable, Pre-filled or refillable cartridge, Pre-filled pod   Substance and Sexual Activity    Alcohol use: Not Currently     Comment: denies    Drug use: Yes     Types: Fentanyl,  Cocaine(coke)    Sexual activity: Defer         Past Medical History:   Diagnosis Date    Substance abuse           Past Surgical History:   Procedure Laterality Date    NO PAST SURGERIES         History reviewed. No pertinent family history.      No medications prior to admission.         ALLERGIES:  Patient has no known allergies.    Temp:  [97.6 °F (36.4 °C)-99.5 °F (37.5 °C)] 97.6 °F (36.4 °C)  Heart Rate:  [62-91] 84  Resp:  [16-18] 16  BP: (110-155)/(67-96) 133/94    REVIEW OF SYSTEMS:  Review of Systems   Constitutional:  Positive for chills, diaphoresis and fatigue.   HENT:  Positive for congestion.    Eyes: Negative.    Respiratory: Negative.     Cardiovascular: Negative.    Gastrointestinal: Negative.    Endocrine: Negative.    Genitourinary: Negative.    Musculoskeletal:  Positive for myalgias.   Skin: Negative.    Allergic/Immunologic: Negative.    Neurological:  Positive for tremors and weakness.   Hematological: Negative.    Psychiatric/Behavioral:  Positive for dysphoric mood. The patient is nervous/anxious.         OBJECTIVE    PHYSICAL EXAM:  Physical Exam  Constitutional:  Appears well-developed and well-nourished.   HENT:   Head: Normocephalic and atraumatic.   Right Ear: External ear normal.   Left Ear: External ear normal.   Mouth/Throat: Oropharynx is clear and moist.   Eyes: Pupils are equal, round, and reactive to light. Conjunctivae and EOM are normal.   Neck: Normal range of motion. Neck supple.   Cardiovascular: Normal rate, regular rhythm and normal heart sounds.    Respiratory: Effort normal and breath sounds normal. No respiratory distress. No wheezes.   GI: Soft. Bowel sounds are normal.No distension. There is no tenderness.   Musculoskeletal: Normal range of motion. No edema or deformity.   Neurological:  Cranial Nerves: I. No anosmia. II: No visual disturbance. III, IV VI: EOMI, PERRLA. V: Corneal reflext intact, no abnormal sensations. VII: No facial palsy, or altered sensation.  VIII: Hearing intact, balance intact. IX: Intact ah reflex. X: Normal phonation, swallowing. XI: Normal shrug and head movement. XII: Intact tongue movements  Coordination normal. No lateralizing signs.  Skin: Skin is warm and dry. No rash noted. No erythema.     MENTAL STATUS EXAM:   Hygiene:   fair  Cooperation:  Cooperative  Eye Contact:  Good  Psychomotor Behavior:  Appropriate  Affect:  Restricted  Hopelessness: Denies  Speech:  Normal  Thought Progress: Goal directed  Thought Content:  Normal  Suicidal:  None  Homicidal:  None  Hallucinations:  None  Delusion:  None  Memory:  Intact  Orientation:  Person, Place, Time, and Situation  Reliability:  fair  Insight:  Fair  Judgement:  Fair  Impulse Control:  Fair    Imaging Results (Last 24 Hours)       ** No results found for the last 24 hours. **             ECG/EMG Results (most recent)       None             Lab Results   Component Value Date    GLUCOSE 99 06/23/2025    BUN 6.5 06/23/2025    CREATININE 0.75 (L) 06/23/2025    BCR 8.7 06/23/2025    CO2 26.9 06/23/2025    CALCIUM 9.3 06/23/2025    ALBUMIN 4.3 06/23/2025    AST 16 06/23/2025    ALT 8 06/23/2025       Lab Results   Component Value Date    WBC 5.95 06/23/2025    HGB 13.6 06/23/2025    HCT 43.1 06/23/2025    MCV 86.5 06/23/2025     06/23/2025       Last Urine Toxicity  More data exists         Latest Ref Rng & Units 6/23/2025 10/21/2024   LAST URINE TOXICITY RESULTS   Amphetamine, Urine Qual Negative Negative  Negative    Barbiturates Screen, Urine Negative Negative  Negative    Benzodiazepine Screen, Urine Negative Negative  Negative    Buprenorphine, Screen, Urine Negative Negative  Negative    Cocaine Screen, Urine Negative Positive  Positive    Fentanyl, Urine Negative Positive  Positive    Methadone Screen , Urine Negative Negative  Negative    Methamphetamine, Ur Negative Negative  Negative        Brief Urine Lab Results  (Last result in the past 365 days)        Color   Clarity   Blood    Leuk Est   Nitrite   Protein   CREAT   Urine HCG        06/23/25 1150 Yellow   Clear   Negative   Negative   Negative   Negative                   DATA  Labs reviewed. UDS positive for fentanyl and cocaine.   EKG reviewed. QTc 378 ms. JIM reviewed.   Record reviewed. The patient was last here in Oct 2024 and treated for opioid and cocaine use disorders and left after a short stay.     Strengths: Motivated for treatment    Weaknesses:Substance use and Poor coping skills    Code status:  Full  Discussed code status with patient.    ASSESSMENT & PLAN:    Hospital bed: No      Opioid use disorder, severe, dependence  -Clonidine detox  -Comfort meds      Cocaine use disorder, severe, dependence  -Supportive treatment    The patient has been admitted for safety and stabilization.  Patient will be monitored for suicidality daily and maintained on Special Precautions Level 4 (q30 min checks).  The patient will have individual and group therapy with a master's level therapist. A master treatment plan will be developed and agreed upon by the patient and his/her treatment team.  The patient's estimated length of stay in the hospital is 5-7 days.

## 2025-06-24 NOTE — PROGRESS NOTES
Navigator is helping with the following referral:    [facility] - [phone]  -Spoke with [facility]. They will accept patient at discharge.  6/24

## 2025-06-24 NOTE — PLAN OF CARE
Problem: Adult Behavioral Health Plan of Care  Goal: Plan of Care Review  Outcome: Progressing  Flowsheets  Taken 6/24/2025 1803  Progress: improving  Patient Agreement with Plan of Care: agrees  Plan of Care Reviewed With: patient  Taken 6/24/2025 1010  Patient Agreement with Plan of Care: agrees   Goal Outcome Evaluation:  Plan of Care Reviewed With: patient  Plan of Care Reviewed With: patient  Patient Agreement with Plan of Care: agrees     Progress: improving

## 2025-06-24 NOTE — PLAN OF CARE
Goal Outcome Evaluation:        Problem: Adult Behavioral Health Plan of Care  Goal: Patient-Specific Goal (Individualization)  Outcome: Progressing  Flowsheets  Taken 6/24/2025 1018 by Estephania Webb CSW  Patient/Family-Specific Goals (Include Timeframe): Patient will identify 2-3 coping skills, complete aftercare plans, address relapse prevention methods, and deny SI/HI prior to discharge in 1-7 days. Patient's long term goal is to complete [facility]hab program through [facility].  Individualized Care Needs: Therapist to offer 1-4 therapy sessions, aftercare planning, safety planning, group therapy, family education, and brief CBT/MI interventions.  Taken 6/24/2025 1012 by Estephania Webb CSW  Patient Personal Strengths:   resourceful   resilient   motivated for treatment   motivated for recovery   stable living environment   spiritual/Yazdanism support   positive educational history   family/social support  Patient Vulnerabilities:   substance abuse/addiction   lacks insight into illness   history of unsuccessful treatment   occupational insecurity   poor impulse control  Taken 6/23/2025 1435 by Tia Hall RN  Anxieties, Fears or Concerns: None verbalized  Goal: Optimized Coping Skills in Response to Life Stressors  Outcome: Progressing  Intervention: Promote Effective Coping Strategies  Flowsheets (Taken 6/24/2025 1018)  Supportive Measures:   active listening utilized   counseling provided   decision-making supported   goal-setting facilitated   verbalization of feelings encouraged   self-responsibility promoted   self-reflection promoted   positive reinforcement provided   relaxation techniques promoted   self-care encouraged  Goal: Develops/Participates in Therapeutic [place] to Support Successful Transition  Outcome: Progressing  Intervention: Foster Therapeutic [place]  Flowsheets (Taken 6/24/2025 1018)  Trust Relationship/Rapport:   care explained   reassurance provided    choices provided   thoughts/feelings acknowledged   emotional support provided   questions answered   empathic listening provided   questions encouraged  Intervention: Mutually Develop Transition Plan  Flowsheets  Taken 6/24/2025 1018  Outpatient/Agency/Support Group Needs: residential services  Transition Support:   follow-up care discussed   community resources reviewed   crisis management plan promoted   crisis management plan verbalized   follow-up care coordinated  Anticipated Discharge Disposition: residential substance use unit  Taken 6/24/2025 1017  Discharge Coordination/Progress: Patient has [employer]. Therapist met with patient to complete assessment. Patient plans to admit to [facility] at discharge.  Transportation Anticipated: agency  Transportation Concerns: none  Current Discharge Risk: substance use/abuse  Concerns to be Addressed:   substance/tobacco abuse/use   discharge planning   mental health   coping/stress   cognitive/perceptual  Readmission Within the Last 30 Days: no previous admission in last 30 days  Patient/Family Anticipated Services at Transition: rehabilitation services  Patient's Choice of Community Agency(s): [facility]  Patient/Family Anticipates Transition to: inpatient rehabilitation facility  Offered/Gave Vendor List: no      DATA:      Therapist met individually with patient this date to introduce role and to discuss hospitalization expectations. Patient agreeable.     Consent signed for [facility].      Clinical Maneuvering/Intervention:     Therapist assisted patient in processing session content; acknowledged and normalized patient’s thoughts, feelings, and concerns. Discussed the therapist/patient relationship and explain the parameters and limitations of relative confidentiality. Also discussed the importance of active participation, and honesty to the treatment process. Encouraged the patient to discuss/vent their feelings, frustrations, and  fears concerning their ongoing medical issues and validated their feelings.     Discussed the importance of finding enjoyable activities and coping skills that the patient can engage in a regular basis. Discussed healthy coping skills such as distraction, self love, grounding, thought challenges/reframing, etc. Provided patient with list of healthy coping skills this date. Discussed the importance of medication compliance. Praised the patient for seeking help and spent the majority of the session building rapport.       Allowed patient to freely discuss issues without interruption or judgment. Provided safe, confidential environment to facilitate the development of positive therapeutic relationship and encourage open, honest communication.      Therapist addressed discharge safety planning this date. Assisted patient in identifying risk factors which would indicate the need for higher level of care after discharge; including thoughts to harm self or others and/or self-harming behavior. Encouraged patient to call 911, or present to the nearest emergency room should any of these events occur. Discussed crisis intervention services and means to access. Encouraged securing any objects of harm.       Therapist completed integrated summary, treatment plan, and initiated social history this date. Therapist is strongly encouraging family involvement in treatment.      Therapist completed [place] Safety Plan.      ASSESSMENT:      The patient is a 29 y/o male admitted for detox treatment and polysubstance abuse. Therapist met with patient for approximately 30 minutes on this date to complete assessment. Patient reports both anxiety and depression to be a 10 out of 10, denies current SI/HI/AVH. Reports experiencing nausea, pain, tremors and irritability. Last [facility] admission was on 10/21/2024. Patient primarily using cocaine and fentanyl. He first started using at 18 y/o, longest period of sobriety has been  "seven months. Patient denies history of physical, emotional or sexual abuse. States that \"everything\" is a stressor. Patient has been living with his mother and is unemployed with a high school education. Patient plans to admit to [facility] when stable, agency to provide transportation. He has not been agreeable to family involvement in treatment. Patient denies having any additional needs or concerns at this time.      PLAN:       Patient to remain hospitalized this date.     Treatment team will focus efforts on stabilizing patient's acute symptoms while providing education on healthy coping and crisis management to reduce hospitalizations. Patient requires daily psychiatrist evaluation and 24/7 nursing supervision to promote patient safety.     Therapist will offer 1-4 individual sessions, 1 therapy group daily, family education, and appropriate referral.    Therapist recommends [facility] residential [facility].                                    "

## 2025-06-24 NOTE — PAYOR COMM NOTE
"Harpal Poe (30 y.o. Male)       Date of Birth   1995    Social Security Number       Address   [address] [place] [address]    Home Phone   [phone]    MRN   [id number]       Latter-day   Moravian    Marital Status   Single                            Admission Date   2025    Admission Type   Emergency    Admitting Provider   Keily Resendiz MD    Attending Provider   Keily Resendiz MD    Department, Room/Bed   [facility] ADULT CD, 1040/2S       Discharge Date       Discharge Disposition       Discharge Destination                                 Attending Provider: Keily Resendiz MD    Allergies: No Known Allergies    Isolation: None   Infection: None   Code Status: CPR    Ht: 180.3 cm (71\")   Wt: 63.3 kg (139 lb 9.6 oz)    Admission Cmt: None   Principal Problem: Polysubstance abuse [F19.10]                   Active Insurance as of 2025       Primary Coverage       Payor Plan Insurance Group Employer/Plan Group    [employer] [place] MEDICAID        Payor Plan Address Payor Plan Phone Number Payor Plan Fax Number Effective Dates    [address] [address] [phone]  2024 - None Entered    [facility] [id number]         Subscriber Name Subscriber Birth Date Member ID       HARPAL POE 1995 [id number]                     Emergency Contacts        (Rel.) Home Phone Work Phone Mobile Phone    ZOILA SHERMAN (Mother) [phone] -- --    [facility] [phone] -- --          NEW/INITIAL INPATIENT BEHAVIORAL HEALTH DETOX AUTHORIZATION REQUEST (ASMA 4.0 REV CODE 0126)  ADMISSION DATE: 2025 TIME: 1246   HARPAL POE : 1995  ADMIITTED FORM EMERGENCY DEPARTMENT   FACILITY: [facility] (NPI [id number]) (TAX ID [id number])  ADDRESS: [address] [place] [address]   DIAGNOSIS PER H&P  POLYSUBSTANCE ABUSE    [facility]. CONTACT [facility]ANDA   PHONE # [phone]   FAX# [phone]      Mandy Keating PA   Physician " Assistant  Emergency Medicine     ED Provider Notes      Attested     Date of Service: 06/23/25 1149  Creation Time: 06/23/25 1149   Related encounter: ED from 6/23/2025 in [facility] EMERGENCY DEPARTMENT with Miguel Justin DO     Attested           Attestation signed by Miguel Justin DO at 06/23/25 1538           SUPERVISE: For this patient encounter, I reviewed the APC's documentation, treatment plan, and medical decision making.  Miguel Justin DO 6/23/2025 15:38 EDT                                   Expand All Collapse All    Subjective  History of Present Illness  This is a 30 year old male patient who presents to the ER with chief complaint of need to detox. Patient uses cocaine and fentanyl by snorting them. He denies IV drug abuse. He denies alcohol abuse, HI and SI. He comes [facility].         Review of Systems   Constitutional: Negative.  Negative for fever.   HENT: Negative.     Respiratory: Negative.     Cardiovascular: Negative.  Negative for chest pain.   Gastrointestinal: Negative.  Negative for abdominal pain.   Endocrine: Negative.    Genitourinary: Negative.  Negative for dysuria.   Skin: Negative.    Neurological: Negative.    Psychiatric/Behavioral: Negative.     All other systems reviewed and are negative.        Medical History        Past Medical History:   Diagnosis Date    Substance abuse              Allergies   No Known Allergies        Surgical History         Past Surgical History:   Procedure Laterality Date    NO PAST SURGERIES                History reviewed. No pertinent family history.     Social History   Social History            Socioeconomic History    Marital status: Single       Spouse name: denies    Number of children: 2    Highest education level: Some college, no degree   Tobacco Use    Smoking status: Every Day       Current packs/day: 0.25       Average packs/day: 0.3 packs/day for 6.0 years (1.5 ttl pk-yrs)       Types: Cigarettes        Start date: 6/23/2019       Passive exposure: Current    Smokeless tobacco: Never   Vaping Use    Vaping status: Every Day    Substances: Nicotine, Flavoring    Devices: Disposable, Pre-filled or refillable cartridge, Pre-filled pod   Substance and Sexual Activity    Alcohol use: Not Currently       Comment: denies    Drug use: Yes       Types: Fentanyl, Cocaine(coke)    Sexual activity: Defer                  Objective  Physical Exam  Vitals and nursing note reviewed.   Constitutional:       General: He is not in acute distress.     Appearance: He is well-developed. He is not diaphoretic.   HENT:      Head: Normocephalic and atraumatic.      Right Ear: External ear normal.      Left Ear: External ear normal.      Nose: Nose normal.   Eyes:      Conjunctiva/sclera: Conjunctivae normal.      Pupils: Pupils are equal, round, and reactive to light.   Neck:      Vascular: No JVD.      Trachea: No tracheal deviation.   Cardiovascular:      Rate and Rhythm: Normal rate and regular rhythm.      Heart sounds: Normal heart sounds. No murmur heard.  Pulmonary:      Effort: Pulmonary effort is normal. No respiratory distress.      Breath sounds: Normal breath sounds. No wheezing.   Abdominal:      General: Bowel sounds are normal.      Palpations: Abdomen is soft.      Tenderness: There is no abdominal tenderness.   Musculoskeletal:         General: No deformity. Normal range of motion.      Cervical back: Normal range of motion and neck supple.   Skin:     General: Skin is warm and dry.      Coloration: Skin is not pale.      Findings: No erythema or rash.   Neurological:      Mental Status: He is alert and oriented to person, place, and time.      Cranial Nerves: No cranial nerve deficit.   Psychiatric:         Behavior: Behavior normal.         Thought Content: Thought content normal.            Procedures              Results for orders placed or performed during the hospital encounter of 06/23/25   Urinalysis With  Microscopic If Indicated (No Culture) - Urine, Clean Catch     Collection Time: 06/23/25 11:50 AM     Specimen: Urine, Clean Catch   Result Value Ref Range     Color, UA Yellow Yellow, Straw     Appearance, UA Clear Clear     pH, UA >9.0 (H) 5.0 - 8.0     Specific Gravity, UA 1.018 1.005 - 1.030     Glucose, UA Negative Negative     Ketones, UA Negative Negative     Bilirubin, UA Negative Negative     Blood, UA Negative Negative     Protein, UA Negative Negative     Leuk Esterase, UA Negative Negative     Nitrite, UA Negative Negative     Urobilinogen, UA 1.0 E.U./dL 0.2 - 1.0 E.U./dL   Urine Drug Screen - Urine, Clean Catch     Collection Time: 06/23/25 11:50 AM     Specimen: Urine, Clean Catch   Result Value Ref Range     THC, Screen, Urine Negative Negative     Phencyclidine (PCP), Urine Negative Negative     Cocaine Screen, Urine Positive (A) Negative     Methamphetamine, Ur Negative Negative     Opiate Screen Negative Negative     Amphetamine Screen, Urine Negative Negative     Benzodiazepine Screen, Urine Negative Negative     Tricyclic Antidepressants Screen Negative Negative     Methadone Screen, Urine Negative Negative     Barbiturates Screen, Urine Negative Negative     Oxycodone Screen, Urine Negative Negative     Buprenorphine, Screen, Urine Negative Negative   Fentanyl, Urine - Urine, Clean Catch     Collection Time: 06/23/25 11:50 AM     Specimen: Urine, Clean Catch   Result Value Ref Range     Fentanyl, Urine Positive (A) Negative   Comprehensive Metabolic Panel     Collection Time: 06/23/25 11:52 AM     Specimen: Blood   Result Value Ref Range     Glucose 99 65 - 99 mg/dL     BUN 6.5 6.0 - 20.0 mg/dL     Creatinine 0.75 (L) 0.76 - 1.27 mg/dL     Sodium 140 136 - 145 mmol/L     Potassium 4.5 3.5 - 5.2 mmol/L     Chloride 104 98 - 107 mmol/L     CO2 26.9 22.0 - 29.0 mmol/L     Calcium 9.3 8.6 - 10.5 mg/dL     Total Protein 7.0 6.0 - 8.5 g/dL     Albumin 4.3 3.5 - 5.2 g/dL     ALT (SGPT) 8 1 - 41 U/L      AST (SGOT) 16 1 - 40 U/L     Alkaline Phosphatase 72 39 - 117 U/L     Total Bilirubin 0.4 0.0 - 1.2 mg/dL     Globulin 2.7 gm/dL     A/G Ratio 1.6 g/dL     BUN/Creatinine Ratio 8.7 7.0 - 25.0     Anion Gap 9.1 5.0 - 15.0 mmol/L     eGFR 123.7 >60.0 mL/min/1.73   Magnesium     Collection Time: 06/23/25 11:52 AM     Specimen: Blood   Result Value Ref Range     Magnesium 2.0 1.6 - 2.6 mg/dL   Ethanol     Collection Time: 06/23/25 11:52 AM     Specimen: Blood   Result Value Ref Range     Ethanol <10 0 - 10 mg/dL     Ethanol % <0.010 %   CBC Auto Differential     Collection Time: 06/23/25 11:52 AM     Specimen: Blood   Result Value Ref Range     WBC 5.95 3.40 - 10.80 10*3/mm3     RBC 4.98 4.14 - 5.80 10*6/mm3     Hemoglobin 13.6 13.0 - 17.7 g/dL     Hematocrit 43.1 37.5 - 51.0 %     MCV 86.5 79.0 - 97.0 fL     MCH 27.3 26.6 - 33.0 pg     MCHC 31.6 31.5 - 35.7 g/dL     RDW 14.0 12.3 - 15.4 %     RDW-SD 44.4 37.0 - 54.0 fl     MPV 9.0 6.0 - 12.0 fL     Platelets 239 140 - 450 10*3/mm3     Neutrophil % 61.5 42.7 - 76.0 %     Lymphocyte % 31.1 19.6 - 45.3 %     Monocyte % 4.4 (L) 5.0 - 12.0 %     Eosinophil % 2.5 0.3 - 6.2 %     Basophil % 0.2 0.0 - 1.5 %     Immature Grans % 0.3 0.0 - 0.5 %     Neutrophils, Absolute 3.66 1.70 - 7.00 10*3/mm3     Lymphocytes, Absolute 1.85 0.70 - 3.10 10*3/mm3     Monocytes, Absolute 0.26 0.10 - 0.90 10*3/mm3     Eosinophils, Absolute 0.15 0.00 - 0.40 10*3/mm3     Basophils, Absolute 0.01 0.00 - 0.20 10*3/mm3     Immature Grans, Absolute 0.02 0.00 - 0.05 10*3/mm3     nRBC 0.0 0.0 - 0.2 /100 WBC   ECG 12 Lead Other; detox     Collection Time: 06/23/25 12:40 PM   Result Value Ref Range     QT Interval 356 ms     QTC Interval 378 ms         ED Course      ED Course as of 06/23/25 1244   Mon Jun 23, 2025   1221 Patient is medically cleared for psych.  [MM]   1241 EKG interpretation normal sinus rhythm 68 bpm QTc is 378 no ST elevations or depressions.  Electronically signed by Miguel KULKARNI  DO Nhan, 06/23/25, 12:41 PM EDT.   [GF]   1243 Patient is being admitted to [facility] [place] 2]. [MM]       ED Course User Index  [GF] Miguel Justin DO  [MM] Mandy Keating PA                                                      Medical Decision Making     This is a 30 year old male patient who presents to the ER with chief complaint of need to detox. Patient uses cocaine and fentanyl by snorting them. He denies IV drug abuse. He denies alcohol abuse, HI and SI. He comes from [facility].         Problems Addressed:  Cocaine abuse: complicated acute illness or injury  Mild fentanyl abuse: complicated acute illness or injury     Amount and/or Complexity of Data Reviewed  Labs: ordered. Decision-making details documented in ED Course.  ECG/medicine tests: ordered. Decision-making details documented in ED Course.           Final diagnoses:   Mild fentanyl abuse   Cocaine abuse         ED Disposition  ED Disposition         ED Disposition   DC/Transfer to Behavioral Health    Condition   Stable    Comment   --                   No follow-up provider specified.         Medication List       No changes were made to your prescriptions during this visit.               Mandy Keating PA  06/23/25 1244               Cosigned by: Miguel Justin DO at 06/23/25 1538     Revision History       Yenny Johnson, RN   Registered Nurse  Nursing     Nursing Note      Signed     Date of Service: 06/23/25 1204  Creation Time: 06/23/25 1204   Related encounter: ED from 6/23/2025 in [facility] EMERGENCY DEPARTMENT with Miguel Justin DO     Signed         Pt assessment complete      [facility] and is seeking medical detox.      Pt reports that he has been using cocaine 0.5 gram every other day intranasally. Last use 6/21/25     Fentanyl- 2-3 grams daily intranasally last use 6/22/25   Pt reports he relapsed 3 months ago     Pt denies si/hi/avh  Depression 10   Anxiety 8  Poor  sleep/appetite   Cows 13                   Yenny Johnson, RN   Registered Nurse  Nursing     Nursing Note      Signed     Date of Service: 06/23/25 1204  Creation Time: 06/23/25 1204   Related encounter: ED from 6/23/2025 in [facility] EMERGENCY DEPARTMENT with Miguel Justin DO     Signed         Pt assessment complete      [facility] and is seeking medical detox.      Pt reports that he has been using cocaine 0.5 gram every other day intranasally. Last use 6/21/25     Fentanyl- 2-3 grams daily intranasally last use 6/22/25   Pt reports he relapsed 3 months ago     Pt denies si/hi/avh  Depression 10   Anxiety 8  Poor sleep/appetite   Cows 13                   Estephania Webb CSW   Therapist  Psychiatry     Plan of Care      Signed     Date of Service: 06/24/25 1019  Creation Time: 06/24/25 1019     Signed         Goal Outcome Evaluation:  Problem: Adult Behavioral Health Plan of Care  Goal: Patient-Specific Goal (Individualization)  Outcome: Progressing  Flowsheets  Taken 6/24/2025 1018 by Estephania Webb CSW  Patient/Family-Specific Goals (Include Timeframe): Patient will identify 2-3 coping skills, complete aftercare plans, address relapse prevention methods, and deny SI/HI prior to discharge in 1-7 days. Patient's long term goal is to complete [facility] residential rehab program through [facility].  Individualized Care Needs: Therapist to offer 1-4 therapy sessions, aftercare planning, safety planning, group therapy, family education, and brief CBT/MI interventions.  Taken 6/24/2025 1012 by Estephania Webb CSW  Patient Personal Strengths:   resourceful   resilient   motivated for treatment   motivated for recovery   stable living environment   spiritual/Rastafari support   positive educational history   family/social support  Patient Vulnerabilities:   substance abuse/addiction   lacks insight into illness   history of unsuccessful treatment   occupational  insecurity   poor impulse control  Taken 6/23/2025 1435 by Tia Hall RN  Anxieties, Fears or Concerns: None verbalized  Goal: Optimized Coping Skills in Response to Life Stressors  Outcome: Progressing  Intervention: Promote Effective Coping Strategies  Flowsheets (Taken 6/24/2025 1018)  Supportive Measures:   active listening utilized   counseling provided   decision-making supported   goal-setting facilitated   verbalization of feelings encouraged   self-responsibility promoted   self-reflection promoted   positive reinforcement provided   relaxation techniques promoted   self-care encouraged  Goal: Develops/Participates in Therapeutic [place] to Support Successful Transition  Outcome: Progressing  Intervention: Foster Therapeutic [place]  Flowsheets (Taken 6/24/2025 1018)  Trust Relationship/Rapport:   care explained   reassurance provided   choices provided   thoughts/feelings acknowledged   emotional support provided   questions answered   empathic listening provided   questions encouraged  Intervention: Mutually Develop Transition Plan  Flowsheets  Taken 6/24/2025 1018  Outpatient/Agency/Support Group Needs: residential services  Transition Support:   follow-up care discussed   community resources reviewed   crisis management plan promoted   crisis management plan verbalized   follow-up care coordinated  Anticipated Discharge Disposition: residential substance use unit  Taken 6/24/2025 1017  Discharge Coordination/Progress: Patient has [facility] Medicaid. Therapist met with patient to complete assessment. Patient plans to admit to [facility] at discharge.  Transportation Anticipated: agency  Transportation Concerns: none  Current Discharge Risk: substance use/abuse  Concerns to be Addressed:   substance/tobacco abuse/use   discharge planning   mental health   coping/stress   cognitive/perceptual  Readmission Within the Last 30 Days: no previous admission in last 30 days  Patient/Family  Anticipated Services at Transition: rehabilitation services  Patient's Choice of Community Agency(s): [facility]  Patient/Family Anticipates Transition to: inpatient rehabilitation facility  Offered/Gave Vendor List: no      DATA:      Therapist met individually with patient this date to introduce role and to discuss hospitalization expectations. Patient agreeable.      Consent signed for [facility].      Clinical Maneuvering/Intervention:     Therapist assisted patient in processing session content; acknowledged and normalized patient’s thoughts, feelings, and concerns. Discussed the therapist/patient relationship and explain the parameters and limitations of relative confidentiality. Also discussed the importance of active participation, and honesty to the treatment process. Encouraged the patient to discuss/vent their feelings, frustrations, and fears concerning their ongoing medical issues and validated their feelings.     Discussed the importance of finding enjoyable activities and coping skills that the patient can engage in a regular basis. Discussed healthy coping skills such as distraction, self love, grounding, thought challenges/reframing, etc. Provided patient with list of healthy coping skills this date. Discussed the importance of medication compliance. Praised the patient for seeking help and spent the majority of the session building rapport.       Allowed patient to freely discuss issues without interruption or judgment. Provided safe, confidential environment to facilitate the development of positive therapeutic relationship and encourage open, honest communication.      Therapist addressed discharge safety planning this date. Assisted patient in identifying risk factors which would indicate the need for higher level of care after discharge; including thoughts to harm self or others and/or self-harming behavior. Encouraged patient to call 911, or present to the nearest emergency room  "should any of these events occur. Discussed crisis intervention services and means to access. Encouraged securing any objects of harm.       Therapist completed integrated summary, treatment plan, and initiated social history this date. Therapist is strongly encouraging family involvement in treatment.       Therapist completed [facility] Safety Plan.      ASSESSMENT:      The patient is a 31 y/o male admitted for detox treatment and polysubstance abuse. Therapist met with patient for approximately 30 minutes on this date to complete assessment. Patient reports both anxiety and depression to be a 10 out of 10, denies current SI/HI/AVH. Reports experiencing nausea, pain, tremors and irritability. Last [facility] admission was on 10/21/2024. Patient primarily using cocaine and fentanyl. He first started using at 18 y/o, longest period of sobriety has been seven months. Patient denies history of physical, emotional or sexual abuse. States that \"everything\" is a stressor. Patient has been living with his mother and is unemployed with a high school education. Patient plans to admit to [facility] when stable, agency to provide transportation. He has not been agreeable to family involvement in treatment. Patient denies having any additional needs or concerns at this time.      PLAN:       Patient to remain hospitalized this date.      Treatment team will focus efforts on stabilizing patient's acute symptoms while providing education on healthy coping and crisis management to reduce hospitalizations. Patient requires daily psychiatrist evaluation and 24/7 nursing supervision to promote patient safety.     Therapist will offer 1-4 individual sessions, 1 therapy group daily, family education, and appropriate referral.     Therapist recommends [facility] residential rehab.                         Intake Information - 06/23/25 3362    What problem (s) brought you here today? Presented to ED requesting detox.  Reports " that he has been snorting 0.5 gram of cocaine every other day and 2-3 grams of fentanyl daily.  Had been clean for 7 months and relapsed in February 2025.  Has 2 prior admissions here in 2024.  Denies SI/HI.  Plans to continue treatment at [facility] upon discharge.  Report given to ENRIQUE Medina.     Contact Information - 06/23/25 1154    Consent to contact given for the following No Consent Given     Medical/Surgical/Psychosocial History - 06/23/25 1154    Approximate date of last complete physical examination --  4-5 months ago   Do you believe that you need HIV testing? Yes   Do you believe that you need Hepatitis C testing? Yes   Have you done anything to injure or harm yourself today? No   Previous Mental Health Treatment medication;outpatient treatment;counseling   Previous Substance Use Treatment detox unit;inpatient rehab   Number of previous psychiatric admissions 0   Number of previous detox admissions 4     Family History - 06/23/25 1155    Marital status Single   Children? Yes   Who will be caring for minor children? mother   Place of birth [facility]   Place raised [facility]   Parent Marital Status not    Location of parents [facility]   Number of siblings 2     Housing/Living Environment - 06/23/25 1157    Current Living Arrangements home   People in Home child(mary), dependent;parent(s)   How long have you lived with the others in your household? 3 months   Will your living arrangements affect your treatment/recovery? No   Do you need assistance with housing options? 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 denies   Will family be involved? No      - 06/23/25 1158    Has patient been in the ? No     Spiritual - 06/23/25 1158    [employer]   Are there spiritual concerns you feel need addressed during treatment? No   Spiritual care consult requested No     Sexual - 06/23/25 1158    Patient is in a monogamous relationship? No   Effect of drugs, alcohol or emotional problems on sexual behavior denies   Has patient ever been accused of inappropriate sexual behavior? No     Legal Issues - 06/23/25 1158    Has patient ever been arrested, charged or convicted of a crime? No   Does patient currently have any outstanding charges? No     Recreational - 06/23/25 1158    Recreational Hobbies   Sports None   Social/Family \"Hangs out\";Visiting;Family time   Solitary Music   Additional activities music   Patient tends to spend time With friends;With family   Patient's ability to be close to others has been affected by Drug use     Current Stressors - 06/23/25 1159    Current stressors Other (comment)   Current stressor details denies   Will stressors affect your treatment success or possibly cause relapse if chemically dependent? No     Screenings - 06/23/25 1200    Abuse Screen (yes response referral indicated)   Feels Unsafe at Home or Work/School no   Feels Threatened by Someone no   Does Anyone Try to Keep You From Having Contact with Others or Doing Things Outside Your Home? no   Physical Signs of Abuse Present no   AUDIT-C (Alcohol Use Disorders Identification Test)   Q1: How often do you have a drink containing alcohol? Never   Q2: How many drinks containing alcohol do you have on a typical day when you are drinking? None   Q3: How often do you have six or more drinks on one occasion? Never   Audit-C Score 0   Clinical Opiate Withdrawal Scale   Resting Pulse Rate: Measured After Patient is Sitting or Lying for One Minute 1   GI Upset: " Over Last Half Hour 1   Sweating: Over Past Half Hour Not Accounted for by Room Temperature or Patient Activity 1   Tremor: Observation of Outstretched Hands 1   Restlessness: Observation During Assessment 0   Yawning: Observation During Assessment 2   Pupil Size 0   Anxiety and Irritability 1   Bone or Joint Aches: If Patient was Having Pain Previously, Only the Additional Component Attributed to Opiate Withdrawal is Scored 2   Gooseflesh Skin 0   Runny Nose or Tearing: Not Accounted for by Cold Symptoms or Allergies 4   Clinical Opiate Withdrawal Scale Total Score 13     Chemical Dependency Addendum - 06/23/25 1201    Patient feels alcohol/drug use is a problem for Both   Patient reports others have expressed concern about patient's alcohol/drug use Yes   Patient's desired goal Abstinence   The amount required to get the desired effect has Increased   Memory loss/blackouts during use No   Has patient previously tried to quit/cut back? Yes   Last attempt and results september-februray   Patient's level of awareness of the relationship between behavioral conditions and pattern of substance abuse Very insightful   Current withdrawal symptoms Sweats/diaphoretic;Body aches;Stomach cramps   Patient has experienced DTs Sweats/diaphoresis   Patient has attended AA/NA No   Patient has sober support system Yes   Patient has a sponsor No   When was patient's last drink? denies   Longest period of sobriety? 7 months   Patient rated craving level 9   Has patient ever accidentally overdosed on drugs? Yes   Details of accidental overdose 2 years ago     Psychosocial Assessment - 06/23/25 1202    General Appearance WDL   General Appearance WDL WDL   Activity WDL   Activity WDL WDL   Daily Functioning WDL   Daily functioning WDL daily functioning   Daily functioning exceptions poor sleep, terminal insomnia;poor appetite   Speech WDL   Speech WDL WDL   Attitude WDL   Attitude WDL WDL   Thought Process WDL   Thought Process WDL WDL    Perceptual State WDL   Perceptual State WDL WDL   Emotion Mood WDL   Emotion/Mood/Affect WDL X;emotion mood   Emotion/Mood anxious;depressed   Patient rated depression level 10   Patient rated anxiety level 8   Cognitive Function WDL   Cognitive function WDL WDL   Functional Status   Usual Activity Tolerance good   Current Activity Tolerance good     Safety - 25 1203    Is patient prone to violence? No   Does patient have a history of violent behavior? No   Has patient ever set fires or been accused of setting fires? No   Does patient have relatives working in the facility? No   Does patient know other patients in the facility? No   Release signed for notification? No   Does patient have any medical conditions/disabilities/limitations that would place them at greater risk during restraint or seclusion? No          History & Physical        Keily Resendiz MD at 25 1410                INITIAL PSYCHIATRIC HISTORY & PHYSICAL    Patient Identification:  Name:  Harpal Poe  Age:  30 y.o.  Sex:  male  :  1995  MRN:  [id number]   Visit Number:  [id number]  Primary Care Physician:  Provider, No Known    SUBJECTIVE    CC/Focus of Exam: Opioid and cocaine use    HPI: Harpal Poe is a 30 y.o. male who was admitted on 2025 with complaints of opioid and cocaine use and withdrawals. The patient reports a long history of substance use. First use was at age 17 when he started using marijuana and he started using pain pills and cocaine at age 21. Over time the use increased and the patient  continued to use despite negative consequences including relationship problems, social and financial problems. The patient endorses symptoms of tolerance and withdrawals and ongoing cravings to use. Has tried to cut down and stop but has not been successful. Spends too much time and resources in pursuit of substance use. Longest period of sobriety is reported to be 8 months.  Currently using a gram of fentanyl  and a gram of cocaine via snorting.   Last use was about two days ago.   Withdrawal symptoms tremors, shaking, congestion, cannot eat, anxiety.     PAST PSYCHIATRIC HX: None reported.     SUBSTANCE USE HX: See HPI.     SOCIAL HX:   Social History     Socioeconomic History    Marital status: Single    Number of children: 1    Highest education level: Some college, no degree   Tobacco Use    Smoking status: Every Day     Current packs/day: 0.25     Average packs/day: 0.3 packs/day for 6.0 years (1.5 ttl pk-yrs)     Types: Cigarettes     Start date: 6/23/2019     Passive exposure: Current    Smokeless tobacco: Never   Vaping Use    Vaping status: Every Day    Substances: Nicotine, Flavoring    Devices: Disposable, Pre-filled or refillable cartridge, Pre-filled pod   Substance and Sexual Activity    Alcohol use: Not Currently     Comment: denies    Drug use: Yes     Types: Fentanyl, Cocaine(coke)    Sexual activity: Defer         Past Medical History:   Diagnosis Date    Substance abuse           Past Surgical History:   Procedure Laterality Date    NO PAST SURGERIES         History reviewed. No pertinent family history.      No medications prior to admission.         ALLERGIES:  Patient has no known allergies.    Temp:  [97.6 °F (36.4 °C)-99.5 °F (37.5 °C)] 97.6 °F (36.4 °C)  Heart Rate:  [62-91] 84  Resp:  [16-18] 16  BP: (110-155)/(67-96) 133/94    REVIEW OF SYSTEMS:  Review of Systems   Constitutional:  Positive for chills, diaphoresis and fatigue.   HENT:  Positive for congestion.    Eyes: Negative.    Respiratory: Negative.     Cardiovascular: Negative.    Gastrointestinal: Negative.    Endocrine: Negative.    Genitourinary: Negative.    Musculoskeletal:  Positive for myalgias.   Skin: Negative.    Allergic/Immunologic: Negative.    Neurological:  Positive for tremors and weakness.   Hematological: Negative.    Psychiatric/Behavioral:  Positive for dysphoric mood. The patient is nervous/anxious.         OBJECTIVE     PHYSICAL EXAM:  Physical Exam  Constitutional:  Appears well-developed and well-nourished.   HENT:   Head: Normocephalic and atraumatic.   Right Ear: External ear normal.   Left Ear: External ear normal.   Mouth/Throat: Oropharynx is clear and moist.   Eyes: Pupils are equal, round, and reactive to light. Conjunctivae and EOM are normal.   Neck: Normal range of motion. Neck supple.   Cardiovascular: Normal rate, regular rhythm and normal heart sounds.    Respiratory: Effort normal and breath sounds normal. No respiratory distress. No wheezes.   GI: Soft. Bowel sounds are normal.No distension. There is no tenderness.   Musculoskeletal: Normal range of motion. No edema or deformity.   Neurological:  Cranial Nerves: I. No anosmia. II: No visual disturbance. III, IV VI: EOMI, PERRLA. V: Corneal reflext intact, no abnormal sensations. VII: No facial palsy, or altered sensation. VIII: Hearing intact, balance intact. IX: Intact ah reflex. X: Normal phonation, swallowing. XI: Normal shrug and head movement. XII: Intact tongue movements  Coordination normal. No lateralizing signs.  Skin: Skin is warm and dry. No rash noted. No erythema.     MENTAL STATUS EXAM:   Hygiene:   fair  Cooperation:  Cooperative  Eye Contact:  Good  Psychomotor Behavior:  Appropriate  Affect:  Restricted  Hopelessness: Denies  Speech:  Normal  Thought Progress: Goal directed  Thought Content:  Normal  Suicidal:  None  Homicidal:  None  Hallucinations:  None  Delusion:  None  Memory:  Intact  Orientation:  Person, Place, Time, and Situation  Reliability:  fair  Insight:  Fair  Judgement:  Fair  Impulse Control:  Fair    Imaging Results (Last 24 Hours)       ** No results found for the last 24 hours. **             ECG/EMG Results (most recent)       None             Lab Results   Component Value Date    GLUCOSE 99 06/23/2025    BUN 6.5 06/23/2025    CREATININE 0.75 (L) 06/23/2025    BCR 8.7 06/23/2025    CO2 26.9 06/23/2025    CALCIUM 9.3  06/23/2025    ALBUMIN 4.3 06/23/2025    AST 16 06/23/2025    ALT 8 06/23/2025       Lab Results   Component Value Date    WBC 5.95 06/23/2025    HGB 13.6 06/23/2025    HCT 43.1 06/23/2025    MCV 86.5 06/23/2025     06/23/2025       Last Urine Toxicity  More data exists         Latest Ref Rng & Units 6/23/2025 10/21/2024   LAST URINE TOXICITY RESULTS   Amphetamine, Urine Qual Negative Negative  Negative    Barbiturates Screen, Urine Negative Negative  Negative    Benzodiazepine Screen, Urine Negative Negative  Negative    Buprenorphine, Screen, Urine Negative Negative  Negative    Cocaine Screen, Urine Negative Positive  Positive    Fentanyl, Urine Negative Positive  Positive    Methadone Screen , Urine Negative Negative  Negative    Methamphetamine, Ur Negative Negative  Negative        Brief Urine Lab Results  (Last result in the past 365 days)        Color   Clarity   Blood   Leuk Est   Nitrite   Protein   CREAT   Urine HCG        06/23/25 1150 Yellow   Clear   Negative   Negative   Negative   Negative                   DATA  Labs reviewed. UDS positive for fentanyl and cocaine.   EKG reviewed. QTc 378 ms. JIM reviewed.   Record reviewed. The patient was last here in Oct 2024 and treated for opioid and cocaine use disorders and left after a short stay.     Strengths: Motivated for treatment    Weaknesses:Substance use and Poor coping skills    Code status:  Full  Discussed code status with patient.    ASSESSMENT & PLAN:    Hospital bed: No      Opioid use disorder, severe, dependence  -Clonidine detox  -Comfort meds      Cocaine use disorder, severe, dependence  -Supportive treatment    The patient has been admitted for safety and stabilization.  Patient will be monitored for suicidality daily and maintained on Special Precautions Level 4 (q30 min checks).  The patient will have individual and group therapy with a master's level therapist. A master treatment plan will be developed and agreed upon by  the patient and his/her treatment team.  The patient's estimated length of stay in the hospital is 5-7 days.             Electronically signed by Keily Resendiz MD at 06/24/25 0974

## 2025-06-24 NOTE — PLAN OF CARE
"Goal Outcome Evaluation:  Plan of Care Reviewed With: patient  Plan of Care Reviewed With: patient  Patient Agreement with Plan of Care: agrees     Progress: no change  Outcome Evaluation: Pt rated anxiety 10/10, depression 10/10, and cravings 0/10. Pt stated he was \"unsure\" of his withdrawal symptoms. Pt denies SI/HI/AVH. Pt did not request any prn medication this shift. Pt did not voice any other concerns at this time.                             "

## 2025-06-25 LAB
QT INTERVAL: 356 MS
QTC INTERVAL: 378 MS

## 2025-06-25 PROCEDURE — 99232 SBSQ HOSP IP/OBS MODERATE 35: CPT | Performed by: PSYCHIATRY & NEUROLOGY

## 2025-06-25 PROCEDURE — 63710000001 ONDANSETRON ODT 4 MG TABLET DISPERSIBLE: Performed by: PSYCHIATRY & NEUROLOGY

## 2025-06-25 RX ORDER — BUPRENORPHINE 2 MG/1
2 TABLET SUBLINGUAL 2 TIMES DAILY
Status: COMPLETED | OUTPATIENT
Start: 2025-06-26 | End: 2025-06-26

## 2025-06-25 RX ORDER — BUPRENORPHINE 2 MG/1
2 TABLET SUBLINGUAL 2 TIMES DAILY
Status: COMPLETED | OUTPATIENT
Start: 2025-06-25 | End: 2025-06-25

## 2025-06-25 RX ORDER — BUPRENORPHINE 2 MG/1
2 TABLET SUBLINGUAL DAILY
Status: COMPLETED | OUTPATIENT
Start: 2025-06-27 | End: 2025-06-27

## 2025-06-25 RX ADMIN — CYCLOBENZAPRINE 10 MG: 10 TABLET, FILM COATED ORAL at 21:08

## 2025-06-25 RX ADMIN — ONDANSETRON 4 MG: 4 TABLET, ORALLY DISINTEGRATING ORAL at 06:33

## 2025-06-25 RX ADMIN — TRAZODONE HYDROCHLORIDE 50 MG: 50 TABLET ORAL at 21:08

## 2025-06-25 RX ADMIN — CLONIDINE HYDROCHLORIDE 0.1 MG: 0.1 TABLET ORAL at 15:11

## 2025-06-25 RX ADMIN — ACETAMINOPHEN 650 MG: 325 TABLET ORAL at 02:57

## 2025-06-25 RX ADMIN — HYDROXYZINE HYDROCHLORIDE 50 MG: 50 TABLET, FILM COATED ORAL at 02:57

## 2025-06-25 RX ADMIN — BUPRENORPHINE HCL 2 MG: 2 TABLET SUBLINGUAL at 15:11

## 2025-06-25 RX ADMIN — HYDRALAZINE HYDROCHLORIDE 25 MG: 25 TABLET ORAL at 17:22

## 2025-06-25 RX ADMIN — BUPRENORPHINE HCL 2 MG: 2 TABLET SUBLINGUAL at 21:08

## 2025-06-25 NOTE — PROGRESS NOTES
"INPATIENT PSYCHIATRIC PROGRESS NOTE    Name:  Harpal Poe  :  1995  MRN:  [id number]  Visit Number:  [id number]  Length of stay:  2    SUBJECTIVE    CC/Focus of Exam: Opioid use    INTERVAL HISTORY:  The patient reports he is experiencing withdrawals including chills, headaches, and vomiting here and there, not able to eat anything and is feeling weak. He would like to take ensure with meals.   Depression rating 10/10  Anxiety rating 10/10  Sleep:Not good  Withdrawal sx: See above  Cravin/10    Review of Systems   Constitutional:  Positive for chills, diaphoresis and fatigue.   Respiratory: Negative.     Cardiovascular: Negative.    Gastrointestinal:  Positive for nausea and vomiting.   Musculoskeletal:  Positive for myalgias.   Neurological:  Positive for headaches.   Psychiatric/Behavioral:  Positive for dysphoric mood. The patient is nervous/anxious.        OBJECTIVE    Temp:  [97.7 °F (36.5 °C)-98.3 °F (36.8 °C)] 98.1 °F (36.7 °C)  Heart Rate:  [64-98] 64  Resp:  [16-18] 18  BP: (124-165)/(74-91) 165/88    MENTAL STATUS EXAM:  Appearance:Casually dressed, good hygeine.   Cooperation:Cooperative  Psychomotor: No psychomotor agitation/retardation, No EPS, No motor tics  Speech-normal rate, amount.  Mood \"anxious\"   Affect-congruent, appropriate, stable  Thought Content-goal directed, no delusional material present  Thought process-linear, organized.  Suicidality: No SI  Homicidality: No HI  Perception: No AH/VH  Insight-fair   Judgement-fair    Lab Results (last 24 hours)       ** No results found for the last 24 hours. **               Imaging Results (Last 24 Hours)       ** No results found for the last 24 hours. **               ECG/EMG Results (most recent)       None             ALLERGIES: Patient has no known allergies.    Medication Review:   Scheduled Medications:        PRN Medications:    acetaminophen    aluminum-magnesium hydroxide-simethicone    benzonatate    benztropine **OR** " benztropine    [] cloNIDine **FOLLOWED BY** cloNIDine **FOLLOWED BY** [START ON 2025] cloNIDine **FOLLOWED BY** [START ON 2025] cloNIDine    cyclobenzaprine    dicyclomine    famotidine    hydrALAZINE    hydrOXYzine    ibuprofen    loperamide    magnesium hydroxide    ondansetron ODT    polyethylene glycol    sodium chloride    traZODone   All medications reviewed.    ASSESSMENT & PLAN:      Opioid use disorder, severe, dependence  -Clonidine detox  -Comfort meds  -Add short Subutex detox       Cocaine use disorder, severe, dependence  -Supportive treatment    Special precautions: Special Precautions Level 4 (q30 min checks).    Behavioral Health Treatment Plan and Problem List: I have reviewed and approved the Behavioral Health Treatment Plan and Problem list.  The patient has had a chance to review and agrees with the treatment plan.    Copied text in portions of this note has been reviewed and is accurate as of 25         Clinician:  Keily Resendiz MD  25  13:59 EDT

## 2025-06-25 NOTE — PLAN OF CARE
"Goal Outcome Evaluation:  Plan of Care Reviewed With: patient  Plan of Care Reviewed With: patient  Patient Agreement with Plan of Care: agrees     Progress: improving  Outcome Evaluation: Pt calm and cooperative with staff, pt spent time in day room this shift. Pt reports poor sleep and appetite. Pt rates anxiety and depression 10. Pt rates cravings for fentanyl at 6 and reports wd s/s as anxiety and pins and needles. Pt denies SI,HI,AVH. Pt c/o pain \"all over\" rated at 10, pt educated on available comfort meds. COWS 4. Pt received PRN tylenol, clonidine, flexeril, atarax, tylenol and trazodone this shift.                             "

## 2025-06-25 NOTE — PLAN OF CARE
Goal Outcome Evaluation:        Problem: Adult Behavioral Health Plan of Care  Goal: Patient-Specific Goal (Individualization)  Outcome: Progressing  Flowsheets  Taken 6/24/2025 1018 by Estephania Webb CSW  Patient/Family-Specific Goals (Include Timeframe): Patient will identify 2-3 coping skills, complete aftercare plans, address relapse prevention methods, and deny SI/HI prior to discharge in 1-7 days. Patient's long term goal is to complete [facility] residential rehab program through [facility].  Individualized Care Needs: Therapist to offer 1-4 therapy sessions, aftercare planning, safety planning, group therapy, family education, and brief CBT/MI interventions.  Taken 6/24/2025 1012 by Estephania Webb CSW  Patient Personal Strengths:   resourceful   resilient   motivated for treatment   motivated for recovery   stable living environment   spiritual/Worship support   positive educational history   family/social support  Patient Vulnerabilities:   substance abuse/addiction   lacks insight into illness   history of unsuccessful treatment   occupational insecurity   poor impulse control  Taken 6/23/2025 1435 by Tia Hall RN  Anxieties, Fears or Concerns: None verbalized  Goal: Optimized Coping Skills in Response to Life Stressors  Outcome: Progressing  Intervention: Promote Effective Coping Strategies  Flowsheets (Taken 6/25/2025 0915)  Supportive Measures:   active listening utilized   counseling provided   goal-setting facilitated   decision-making supported   verbalization of feelings encouraged   self-reflection promoted   positive reinforcement provided   self-responsibility promoted   relaxation techniques promoted   self-care encouraged  Goal: Develops/Participates in Therapeutic [place] to Support Successful Transition  Outcome: Progressing  Intervention: Foster Therapeutic [place]  Flowsheets (Taken 6/25/2025 0915)  Trust Relationship/Rapport:   care explained   reassurance provided    choices provided   thoughts/feelings acknowledged   emotional support provided   questions answered   empathic listening provided   questions encouraged  Intervention: Mutually Develop Transition Plan  Flowsheets  Taken 6/25/2025 0905  Discharge Coordination/Progress: Patient has [employer]. Patient to return to [facility] at discharge, agency to provide transportation.  Transportation Anticipated: agency  Transportation Concerns: none  Current Discharge Risk: substance use/abuse  Concerns to be Addressed:   substance/tobacco abuse/use   discharge planning   mental health   coping/stress   cognitive/perceptual  Readmission Within the Last 30 Days: no previous admission in last 30 days  Patient/Family Anticipated Services at Transition: rehabilitation services  Patient's Choice of Community Agency(s): [facility]  Patient/Family Anticipates Transition to: inpatient rehabilitation facility  Offered/Gave Vendor List: no  Taken 6/24/2025 1018  Outpatient/Agency/Support Group Needs: residential services  Transition Support:   follow-up care discussed   community resources reviewed   crisis management plan promoted   crisis management plan verbalized   follow-up care coordinated  Anticipated Discharge Disposition: residential substance use unit      DATA:  Therapist met with patient individually this date. Patient agreeable to discuss current treatment progress and discharge concerns.     CLINICAL MANUVERING/INTERVENTIONS:    Assisted patient in processing session content; acknowledged and normalized patient’s thoughts, feelings, and concerns by utilizing a person-centered approach in efforts to build appropriate rapport and a positive therapeutic relationship with open and honest communication. Allowed patient to ventilate regarding current stressors and triggers for negative emotions and thoughts in a safe nonjudgmental environment with unconditional positive regard, active listening skills, and  empathy. Therapist implemented motivational interviewing techniques to assist patient with exploring personal growth and change and discussed distress tolerance skills, self soothing techniques, and applied cognitive behavioral strategies to facilitate identification of maladaptive patterns of thinking and behavior. Therapist assisted patient with identifying and implementing healthier coping strategies.     ASSESSMENT:  Therapist met with patient on this date, he continues to receive treatment for detox. Patient reports both anxiety and depression to be a 10 out of 10, denies current SI/HI/AVH. Patient states he is still not feeling well, experiencing chills, sweats, weakness and nausea. Discussed how he really wants to eat but can't, how he doesn't have the drive to do anything right now. Patient plans to return to [facility] when stable, agency to provide transportation. Patient has not been agreeable to family involvement in treatment. He denies having any additional needs or concerns at this time.     PLAN:   Patient will continue stabilization. Patient will continue to receive services offered by treatment team.     Patient to return to [facility] at discharge.

## 2025-06-26 PROCEDURE — 99232 SBSQ HOSP IP/OBS MODERATE 35: CPT | Performed by: PSYCHIATRY & NEUROLOGY

## 2025-06-26 RX ADMIN — HYDROXYZINE HYDROCHLORIDE 50 MG: 50 TABLET, FILM COATED ORAL at 21:08

## 2025-06-26 RX ADMIN — TRAZODONE HYDROCHLORIDE 50 MG: 50 TABLET ORAL at 21:08

## 2025-06-26 RX ADMIN — BUPRENORPHINE HCL 2 MG: 2 TABLET SUBLINGUAL at 21:08

## 2025-06-26 RX ADMIN — CYCLOBENZAPRINE 10 MG: 10 TABLET, FILM COATED ORAL at 21:08

## 2025-06-26 RX ADMIN — BUPRENORPHINE HCL 2 MG: 2 TABLET SUBLINGUAL at 08:44

## 2025-06-26 RX ADMIN — HYDROXYZINE HYDROCHLORIDE 50 MG: 50 TABLET, FILM COATED ORAL at 08:44

## 2025-06-26 RX ADMIN — CYCLOBENZAPRINE 10 MG: 10 TABLET, FILM COATED ORAL at 08:44

## 2025-06-26 NOTE — PROGRESS NOTES
"INPATIENT PSYCHIATRIC PROGRESS NOTE    Name:  Harpal Poe  :  1995  MRN:  [id number]  Visit Number:  [id number]  Length of stay:  3    SUBJECTIVE    CC/Focus of Exam: Opioid use    INTERVAL HISTORY:  The patient reports he is feeling better today and is not experiencing any active withdrawals.    Depression rating 4/10  Anxiety rating 5/10  Sleep: good  Withdrawal sx: None today  Cravin/10    Review of Systems   Respiratory: Negative.     Cardiovascular: Negative.    Psychiatric/Behavioral:  The patient is nervous/anxious.        OBJECTIVE    Temp:  [97.9 °F (36.6 °C)-98.8 °F (37.1 °C)] 97.9 °F (36.6 °C)  Heart Rate:  [] 84  Resp:  [16-18] 18  BP: (114-167)/() 153/100    MENTAL STATUS EXAM:  Appearance:Casually dressed, good hygeine.   Cooperation:Cooperative  Psychomotor: No psychomotor agitation/retardation, No EPS, No motor tics  Speech-normal rate, amount.  Mood \"better\"   Affect-congruent, appropriate, stable  Thought Content-goal directed, no delusional material present  Thought process-linear, organized.  Suicidality: No SI  Homicidality: No HI  Perception: No AH/VH  Insight-fair   Judgement-fair    Lab Results (last 24 hours)       ** No results found for the last 24 hours. **               Imaging Results (Last 24 Hours)       ** No results found for the last 24 hours. **               ECG/EMG Results (most recent)       None             ALLERGIES: Patient has no known allergies.    Medication Review:   Scheduled Medications:  buprenorphine, 2 mg, Sublingual, BID   Followed by  [START ON 2025] buprenorphine, 2 mg, Sublingual, Daily         PRN Medications:    acetaminophen    aluminum-magnesium hydroxide-simethicone    benzonatate    benztropine **OR** benztropine    [] cloNIDine **FOLLOWED BY** [] cloNIDine **FOLLOWED BY** cloNIDine **FOLLOWED BY** [START ON 2025] cloNIDine    cyclobenzaprine    dicyclomine    famotidine    hydrALAZINE    hydrOXYzine    " ibuprofen    loperamide    magnesium hydroxide    ondansetron ODT    polyethylene glycol    sodium chloride    traZODone   All medications reviewed.    ASSESSMENT & PLAN:      Opioid use disorder, severe, dependence  -Clonidine detox  -Comfort meds  -Added short Subutex detox       Cocaine use disorder, severe, dependence  -Supportive treatment    Special precautions: Special Precautions Level 4 (q30 min checks).    Behavioral Health Treatment Plan and Problem List: I have reviewed and approved the Behavioral Health Treatment Plan and Problem list.  The patient has had a chance to review and agrees with the treatment plan.    Copied text in portions of this note has been reviewed and is accurate as of 06/26/25         Clinician:  Keily Resendiz MD  06/26/25  13:26 EDT

## 2025-06-26 NOTE — PLAN OF CARE
Goal Outcome Evaluation:  Plan of Care Reviewed With: patient  Patient Agreement with Plan of Care: agrees     Progress: improving  Outcome Evaluation: Pt calm and cooperative this shift. Pt denies anxiety, depression and SI/HI/AVH. No distress noted.

## 2025-06-26 NOTE — PLAN OF CARE
Goal Outcome Evaluation:  Plan of Care Reviewed With: patient  Plan of Care Reviewed With: patient  Patient Agreement with Plan of Care: agrees     Progress: improving  Outcome Evaluation: Pt has been calm, pleasant, and cooperative throughout the shift. Denies depression, SI/HI/AVH. Subjective symptoms nausea, backpain. Pt received prn flexeril, and hydroxyzine with symptom improvement.

## 2025-06-26 NOTE — PLAN OF CARE
Goal Outcome Evaluation:        Problem: Adult Behavioral Health Plan of Care  Goal: Patient-Specific Goal (Individualization)  Outcome: Progressing  Flowsheets  Taken 6/24/2025 1018 by Estephania Webb CSW  Patient/Family-Specific Goals (Include Timeframe): Patient will identify 2-3 coping skills, complete aftercare plans, address relapse prevention methods, and deny SI/HI prior to discharge in 1-7 days. Patient's long term goal is to complete [facility] residential rehab program through [facility].  Individualized Care Needs: Therapist to offer 1-4 therapy sessions, aftercare planning, safety planning, group therapy, family education, and brief CBT/MI interventions.  Taken 6/24/2025 1012 by Estephania Webb CSW  Patient Personal Strengths:   resourceful   resilient   motivated for treatment   motivated for recovery   stable living environment   spiritual/Hoahaoism support   positive educational history   family/social support  Patient Vulnerabilities:   substance abuse/addiction   lacks insight into illness   history of unsuccessful treatment   occupational insecurity   poor impulse control  Taken 6/23/2025 1435 by Tia Hall RN  Anxieties, Fears or Concerns: None verbalized  Goal: Optimized Coping Skills in Response to Life Stressors  Outcome: Progressing  Intervention: Promote Effective Coping Strategies  Flowsheets (Taken 6/26/2025 0935)  Supportive Measures:   active listening utilized   counseling provided   decision-making supported   goal-setting facilitated   verbalization of feelings encouraged   self-responsibility promoted   self-reflection promoted   positive reinforcement provided   relaxation techniques promoted   self-care encouraged  Goal: Develops/Participates in Therapeutic [place] to Support Successful Transition  Outcome: Progressing  Intervention: Foster Therapeutic [place]  Flowsheets (Taken 6/26/2025 0935)  Trust Relationship/Rapport:   care explained   reassurance provided    choices provided   thoughts/feelings acknowledged   empathic listening provided   emotional support provided   questions answered   questions encouraged  Intervention: Mutually Develop Transition Plan  Flowsheets  Taken 6/26/2025 0935  Transportation Anticipated: agency  Transportation Concerns: none  Current Discharge Risk: substance use/abuse  Concerns to be Addressed:   substance/tobacco abuse/use   discharge planning   mental health   coping/stress   cognitive/perceptual  Readmission Within the Last 30 Days: no previous admission in last 30 days  Patient/Family Anticipated Services at Transition: rehabilitation services  Patient/Family Anticipates Transition to: inpatient rehabilitation facility  Offered/Gave Vendor List: no  Taken 6/25/2025 0905  Discharge Coordination/Progress: Patient has [employer]. Patient to return to [facility] at discharge, agency to provide transportation.  Patient's Choice of Community Agency(s): [facility]  Taken 6/24/2025 1018  Outpatient/Agency/Support Group Needs: residential services  Transition Support:   follow-up care discussed   community resources reviewed   crisis management plan promoted   crisis management plan verbalized   follow-up care coordinated  Anticipated Discharge Disposition: residential substance use unit      DATA:  Therapist met with patient individually this date. Patient agreeable to discuss current treatment progress and discharge concerns.     CLINICAL MANUVERING/INTERVENTIONS:    Assisted patient in processing session content; acknowledged and normalized patient’s thoughts, feelings, and concerns by utilizing a person-centered approach in efforts to build appropriate rapport and a positive therapeutic relationship with open and honest communication. Allowed patient to ventilate regarding current stressors and triggers for negative emotions and thoughts in a safe nonjudgmental environment with unconditional positive regard, active  listening skills, and empathy. Therapist implemented motivational interviewing techniques to assist patient with exploring personal growth and change and discussed distress tolerance skills, self soothing techniques, and applied cognitive behavioral strategies to facilitate identification of maladaptive patterns of thinking and behavior. Therapist utilized dialectical behavior techniques to teach and model emotional regulation and relaxation methods. Therapist assisted patient with identifying and implementing healthier coping strategies.     ASSESSMENT:  Therapist met with patient on this date. He continues to receive treatment for detox and expects to be stable for discharge tomorrow. Patient reports depression to be a 4 out of 10, anxiety to be a 5. He denies current SI/HI/AVH. Denies experiencing active withdrawal symptoms. Patient has spent more time out of his room today, participating in groups. He discussed wanting to change for himself and for his children. He has had difficulty staying  from the mother of his children but he is realizing that he cannot force her into sobriety and that he needs to take care of himself and his kids. His mother is helping with his children while he is in treatment. Patient plans to return to [facility], agency to provide transportation. He has not been agreeable to family involvement in treatment. Patient denies having any additional needs or concerns at this time.     PLAN:   Patient will continue stabilization. Patient will continue to receive services offered by treatment team.     Patient to return to [facility] at discharge.

## 2025-06-27 VITALS
WEIGHT: 139.6 LBS | BODY MASS INDEX: 19.54 KG/M2 | RESPIRATION RATE: 16 BRPM | HEIGHT: 71 IN | TEMPERATURE: 97.8 F | DIASTOLIC BLOOD PRESSURE: 80 MMHG | OXYGEN SATURATION: 99 % | SYSTOLIC BLOOD PRESSURE: 121 MMHG | HEART RATE: 92 BPM

## 2025-06-27 PROCEDURE — 99238 HOSP IP/OBS DSCHRG MGMT 30/<: CPT | Performed by: PSYCHIATRY & NEUROLOGY

## 2025-06-27 RX ADMIN — HYDROXYZINE HYDROCHLORIDE 50 MG: 50 TABLET, FILM COATED ORAL at 08:18

## 2025-06-27 RX ADMIN — CLONIDINE HYDROCHLORIDE 0.1 MG: 0.1 TABLET ORAL at 08:15

## 2025-06-27 RX ADMIN — CYCLOBENZAPRINE 10 MG: 10 TABLET, FILM COATED ORAL at 08:15

## 2025-06-27 RX ADMIN — DICYCLOMINE HYDROCHLORIDE 10 MG: 10 CAPSULE ORAL at 10:10

## 2025-06-27 RX ADMIN — IBUPROFEN 400 MG: 400 TABLET, FILM COATED ORAL at 08:15

## 2025-06-27 RX ADMIN — BUPRENORPHINE HCL 2 MG: 2 TABLET SUBLINGUAL at 08:15

## 2025-06-27 NOTE — CASE MANAGEMENT/SOCIAL WORK
Patient Name:  Harpal Poe  YOB: 1995  MRN: [id number]  Admit Date:  6/23/2025    Patient expected to discharge back to [facility] on this date, agency to provide transportation. Patient reports improvement in mood and withdrawal symptoms, denies SI/HI/AVH. Healthy coping skills, relapse prevention, and safety planning have been reviewed. Patient has been assisted with identifying risk factors which would indicate the need for higher level of care including thoughts to harm self or others and/or self-harming behavior. Encouraged patient to notify [facility] staff, call [phone] or present to the nearest emergency room should any of these events occur. Patient adamantly and convincingly denies suicidal and homicidal ideation or perceptual disturbance on day of discharge. Patient has not been agreeable to family involvement in treatment. No other needs identified.     Electronically signed by:  ARAM Mahan  06/27/25 09:06 EDT

## 2025-06-27 NOTE — PLAN OF CARE
Goal Outcome Evaluation:  Plan of Care Reviewed With: patient  Patient Agreement with Plan of Care: agrees        Outcome Evaluation: Pt reports fair appetite and good sleep. A 6 D 4. Cravings 5. Denies SI/HI/AVH. Reports body aches.

## 2025-06-27 NOTE — PLAN OF CARE
Goal Outcome Evaluation:  Plan of Care Reviewed With: patient  Plan of Care Reviewed With: patient  Patient Agreement with Plan of Care: agrees     Progress: improving  Outcome Evaluation: Pt being discharged to [facility].

## 2025-06-27 NOTE — PROGRESS NOTES
Behavioral Health Discharge Summary             Please fax within 24 hours of discharge to [facility] at: [phone]      Member Name: Harpal Poe Member ID: [id number]   Authorization Number: [id number] Phone: [phone]   Member Address: [address] [place] [id number]   Discharge Date: 06/27/2025 Level of Care at Discharge:    Facility: [facility] Staff Completing Form: Hamida PRESTON   If the member is being discharged directly to a residential or extended care program, please specify the type below.   __Private Child-Caring Facility (PCC) Residential/Group Home   __Private Child-Caring Facility (PCC) Therapeutic Foster Care   __Residential Treatment Facility (RTF)   __Psychiatric Residential Treatment Facility (PRTF I or II)   __Long-Term Acute Inpatient Hospital Services or Extended Care Unit (ECU)   __Other (please specify):    Brief discharge summary of treatment received (for follow up by the case management team): D/C clinical with list of medications and follow up appts given to patient upon discharge.     BRIEF SUMMARY OF RECOMMENDATIONS FOR ONGOING TREATMENT     Discharged to where: [facility]    Discharge diagnoses: F 11.20   Axis I:    Axis II:    Axis III:    Axis IV:    Axis V:    Does the member understand his/her DX?  Yes          Medication     Dose     Schedule Supply/  Quantity  Given at Discharge RX Provided  Yes/No  If Rx Provided, Quantity RX Prior Auth Required  Yes/No Prior Auth  Completed   No medication ordered                                                                                           Does the member understand the reason for taking these medications? Yes                                                           FOLLOW-UP APPOINTMENTS   Please schedule within 7 days of discharge and provide appointment details for all referred services.    PCP/Other Providers Involved in Treatment:    Appointment Type: [facility]  Provider Name:  [facility]  Provider Phone: [phone] Appointment Date: 06/27/2025 Appointment Time:   readmit     Assessment   (new to OP services)        Case Management    Is the member already enrolled in case management?  Yes/No  If yes, date the CM was notified:    If no, was the CM referral offered?  Yes/No  Accepted? Yes/No    Is the Release of Information in the chart? Yes/No:      Medication Management (for member discharged with psychiatric medications):      A&D Treatment (for member with substance abuse/   dependence in the past year):      Medical Condition (for member with a medical condition):    Other recommended treatment:    Do you have any concerns about the discharge plan?  No    If yes, explain:    Was the member involved in the discharge planning?  Yes    If no, explain:    Was a copy of the discharge plan provided to the member?  Yes    If no, explain: